# Patient Record
Sex: FEMALE | Race: WHITE | NOT HISPANIC OR LATINO | Employment: OTHER | URBAN - METROPOLITAN AREA
[De-identification: names, ages, dates, MRNs, and addresses within clinical notes are randomized per-mention and may not be internally consistent; named-entity substitution may affect disease eponyms.]

---

## 2018-05-14 ENCOUNTER — HISTORICAL (OUTPATIENT)
Dept: RADIOLOGY | Facility: HOSPITAL | Age: 58
End: 2018-05-14

## 2018-06-18 ENCOUNTER — HISTORICAL (OUTPATIENT)
Dept: RADIOLOGY | Facility: HOSPITAL | Age: 58
End: 2018-06-18

## 2018-08-21 ENCOUNTER — HISTORICAL (OUTPATIENT)
Dept: ADMINISTRATIVE | Facility: HOSPITAL | Age: 58
End: 2018-08-21

## 2018-08-21 LAB — CK SERPL-CCNC: 64 UNIT/L (ref 26–192)

## 2018-09-04 ENCOUNTER — HISTORICAL (OUTPATIENT)
Dept: RADIOLOGY | Facility: HOSPITAL | Age: 58
End: 2018-09-04

## 2018-10-15 ENCOUNTER — HISTORICAL (OUTPATIENT)
Dept: INTERNAL MEDICINE | Facility: CLINIC | Age: 58
End: 2018-10-15

## 2018-10-15 LAB
BUN SERPL-MCNC: 19 MG/DL (ref 7–18)
CALCIUM SERPL-MCNC: 8.9 MG/DL (ref 8.5–10.1)
CHLORIDE SERPL-SCNC: 104 MMOL/L (ref 98–107)
CO2 SERPL-SCNC: 29 MMOL/L (ref 21–32)
CREAT SERPL-MCNC: 0.9 MG/DL (ref 0.6–1.3)
CREAT/UREA NIT SERPL: 21
EST. AVERAGE GLUCOSE BLD GHB EST-MCNC: 117 MG/DL
GLUCOSE SERPL-MCNC: 83 MG/DL (ref 74–106)
HBA1C MFR BLD: 5.7 % (ref 4.2–6.3)
POTASSIUM SERPL-SCNC: 4.2 MMOL/L (ref 3.5–5.1)
SODIUM SERPL-SCNC: 140 MMOL/L (ref 136–145)

## 2019-12-11 ENCOUNTER — HISTORICAL (OUTPATIENT)
Dept: ADMINISTRATIVE | Facility: HOSPITAL | Age: 59
End: 2019-12-11

## 2019-12-11 LAB
ABS NEUT (OLG): 3.02 X10(3)/MCL (ref 2.1–9.2)
ALBUMIN SERPL-MCNC: 3.8 GM/DL (ref 3.4–5)
ALBUMIN/GLOB SERPL: 1 RATIO (ref 1.1–2)
ALP SERPL-CCNC: 117 UNIT/L (ref 45–117)
ALT SERPL-CCNC: 29 UNIT/L (ref 12–78)
AST SERPL-CCNC: 19 UNIT/L (ref 15–37)
BASOPHILS # BLD AUTO: 0 X10(3)/MCL (ref 0–0.2)
BASOPHILS NFR BLD AUTO: 1 %
BILIRUB SERPL-MCNC: 0.4 MG/DL (ref 0.2–1)
BILIRUBIN DIRECT+TOT PNL SERPL-MCNC: 0.1 MG/DL (ref 0–0.2)
BILIRUBIN DIRECT+TOT PNL SERPL-MCNC: 0.3 MG/DL
BUN SERPL-MCNC: 14 MG/DL (ref 7–18)
CALCIUM SERPL-MCNC: 9.3 MG/DL (ref 8.5–10.1)
CHLORIDE SERPL-SCNC: 109 MMOL/L (ref 98–107)
CHOLEST SERPL-MCNC: 274 MG/DL
CHOLEST/HDLC SERPL: 5.8 {RATIO} (ref 0–4.4)
CO2 SERPL-SCNC: 28 MMOL/L (ref 21–32)
CREAT SERPL-MCNC: 0.8 MG/DL (ref 0.6–1.3)
EOSINOPHIL # BLD AUTO: 0.1 X10(3)/MCL (ref 0–0.9)
EOSINOPHIL NFR BLD AUTO: 2 %
ERYTHROCYTE [DISTWIDTH] IN BLOOD BY AUTOMATED COUNT: 14 % (ref 11.5–14.5)
EST. AVERAGE GLUCOSE BLD GHB EST-MCNC: 126 MG/DL
GLOBULIN SER-MCNC: 4 GM/ML (ref 2.3–3.5)
GLUCOSE SERPL-MCNC: 95 MG/DL (ref 74–106)
HBA1C MFR BLD: 6 % (ref 4.2–6.3)
HCT VFR BLD AUTO: 41.9 % (ref 35–46)
HDLC SERPL-MCNC: 47 MG/DL (ref 40–59)
HGB BLD-MCNC: 12.8 GM/DL (ref 12–16)
IMM GRANULOCYTES # BLD AUTO: 0.02 10*3/UL
IMM GRANULOCYTES NFR BLD AUTO: 0 %
LDLC SERPL CALC-MCNC: 195 MG/DL
LYMPHOCYTES # BLD AUTO: 1.9 X10(3)/MCL (ref 0.6–4.6)
LYMPHOCYTES NFR BLD AUTO: 34 %
MCH RBC QN AUTO: 29 PG (ref 26–34)
MCHC RBC AUTO-ENTMCNC: 30.5 GM/DL (ref 31–37)
MCV RBC AUTO: 95 FL (ref 80–100)
MONOCYTES # BLD AUTO: 0.4 X10(3)/MCL (ref 0.1–1.3)
MONOCYTES NFR BLD AUTO: 7 %
NEUTROPHILS # BLD AUTO: 3.02 X10(3)/MCL (ref 2.1–9.2)
NEUTROPHILS NFR BLD AUTO: 55 %
PLATELET # BLD AUTO: 274 X10(3)/MCL (ref 130–400)
PMV BLD AUTO: 11.5 FL (ref 7.4–10.4)
POTASSIUM SERPL-SCNC: 4.1 MMOL/L (ref 3.5–5.1)
PROT SERPL-MCNC: 7.8 GM/DL (ref 6.4–8.2)
RBC # BLD AUTO: 4.41 X10(6)/MCL (ref 4–5.2)
SODIUM SERPL-SCNC: 141 MMOL/L (ref 136–145)
TRIGL SERPL-MCNC: 159 MG/DL
TSH SERPL-ACNC: 1.99 MIU/L (ref 0.36–3.74)
VLDLC SERPL CALC-MCNC: 32 MG/DL
WBC # SPEC AUTO: 5.5 X10(3)/MCL (ref 4.5–11)

## 2019-12-16 ENCOUNTER — HISTORICAL (OUTPATIENT)
Dept: SURGERY | Facility: HOSPITAL | Age: 59
End: 2019-12-16

## 2020-01-23 ENCOUNTER — HISTORICAL (OUTPATIENT)
Dept: RADIOLOGY | Facility: HOSPITAL | Age: 60
End: 2020-01-23

## 2020-03-06 ENCOUNTER — HISTORICAL (OUTPATIENT)
Dept: ADMINISTRATIVE | Facility: HOSPITAL | Age: 60
End: 2020-03-06

## 2020-03-06 LAB
CK SERPL-CCNC: 84 UNIT/L (ref 26–192)
CRP SERPL HS-MCNC: 5.71 MG/L (ref 0–3)
ERYTHROCYTE [SEDIMENTATION RATE] IN BLOOD: 27 MM/HR (ref 0–20)

## 2020-06-19 ENCOUNTER — HISTORICAL (OUTPATIENT)
Dept: ADMINISTRATIVE | Facility: HOSPITAL | Age: 60
End: 2020-06-19

## 2020-06-19 LAB
ALBUMIN SERPL-MCNC: 3.9 GM/DL (ref 3.4–5)
ALBUMIN/GLOB SERPL: 1 RATIO (ref 1.1–2)
ALP SERPL-CCNC: 96 UNIT/L (ref 45–117)
ALT SERPL-CCNC: 24 UNIT/L (ref 12–78)
AST SERPL-CCNC: 22 UNIT/L (ref 15–37)
BILIRUB SERPL-MCNC: 0.5 MG/DL (ref 0.2–1)
BILIRUBIN DIRECT+TOT PNL SERPL-MCNC: 0.2 MG/DL (ref 0–0.2)
BILIRUBIN DIRECT+TOT PNL SERPL-MCNC: 0.3 MG/DL
BUN SERPL-MCNC: 8 MG/DL (ref 7–18)
CALCIUM SERPL-MCNC: 9.1 MG/DL (ref 8.5–10.1)
CHLORIDE SERPL-SCNC: 109 MMOL/L (ref 98–107)
CHOLEST SERPL-MCNC: 159 MG/DL
CHOLEST/HDLC SERPL: 3.5 {RATIO} (ref 0–4.4)
CO2 SERPL-SCNC: 28 MMOL/L (ref 21–32)
CREAT SERPL-MCNC: 0.9 MG/DL (ref 0.6–1.3)
EST. AVERAGE GLUCOSE BLD GHB EST-MCNC: 160 MG/DL
GLOBULIN SER-MCNC: 3.8 GM/ML (ref 2.3–3.5)
GLUCOSE SERPL-MCNC: 100 MG/DL (ref 74–106)
HBA1C MFR BLD: 7.2 % (ref 4.2–6.3)
HDLC SERPL-MCNC: 46 MG/DL (ref 40–59)
LDLC SERPL CALC-MCNC: 89 MG/DL
POTASSIUM SERPL-SCNC: 4.2 MMOL/L (ref 3.5–5.1)
PROT SERPL-MCNC: 7.7 GM/DL (ref 6.4–8.2)
SODIUM SERPL-SCNC: 141 MMOL/L (ref 136–145)
TRIGL SERPL-MCNC: 118 MG/DL
VLDLC SERPL CALC-MCNC: 24 MG/DL

## 2020-08-03 ENCOUNTER — HISTORICAL (OUTPATIENT)
Dept: RADIOLOGY | Facility: HOSPITAL | Age: 60
End: 2020-08-03

## 2020-08-03 LAB — CREAT SERPL-MCNC: 0.9 MG/DL (ref 0.6–1.3)

## 2020-09-04 ENCOUNTER — HISTORICAL (OUTPATIENT)
Dept: LAB | Facility: HOSPITAL | Age: 60
End: 2020-09-04

## 2020-09-04 LAB
ALBUMIN SERPL-MCNC: 3.8 GM/DL (ref 3.4–5)
ALBUMIN/GLOB SERPL: 1.2 RATIO (ref 1.1–2)
ALP SERPL-CCNC: 86 UNIT/L (ref 45–117)
ALT SERPL-CCNC: 22 UNIT/L (ref 12–78)
AST SERPL-CCNC: 20 UNIT/L (ref 15–37)
BILIRUB SERPL-MCNC: 0.6 MG/DL (ref 0.2–1)
BILIRUBIN DIRECT+TOT PNL SERPL-MCNC: 0.2 MG/DL (ref 0–0.2)
BILIRUBIN DIRECT+TOT PNL SERPL-MCNC: 0.4 MG/DL
BUN SERPL-MCNC: 18 MG/DL (ref 7–18)
CALCIUM SERPL-MCNC: 9.2 MG/DL (ref 8.5–10.1)
CHLORIDE SERPL-SCNC: 107 MMOL/L (ref 98–107)
CHOLEST SERPL-MCNC: 188 MG/DL
CHOLEST/HDLC SERPL: 3.7 {RATIO} (ref 0–4.4)
CO2 SERPL-SCNC: 28 MMOL/L (ref 21–32)
CREAT SERPL-MCNC: 0.9 MG/DL (ref 0.6–1.3)
CREAT UR-MCNC: 191 MG/DL
EST. AVERAGE GLUCOSE BLD GHB EST-MCNC: 117 MG/DL
GLOBULIN SER-MCNC: 3.3 GM/ML (ref 2.3–3.5)
GLUCOSE SERPL-MCNC: 89 MG/DL (ref 74–106)
HBA1C MFR BLD: 5.7 % (ref 4.2–6.3)
HDLC SERPL-MCNC: 51 MG/DL (ref 40–59)
LDLC SERPL CALC-MCNC: 118 MG/DL
MICROALBUMIN UR-MCNC: 5.7 MG/L (ref 0–19)
MICROALBUMIN/CREAT RATIO PNL UR: 3 MCG/MG CR (ref 0–29)
POTASSIUM SERPL-SCNC: 4.6 MMOL/L (ref 3.5–5.1)
PROT SERPL-MCNC: 7.1 GM/DL (ref 6.4–8.2)
SODIUM SERPL-SCNC: 141 MMOL/L (ref 136–145)
TRIGL SERPL-MCNC: 94 MG/DL
TSH SERPL-ACNC: 3.62 MIU/L (ref 0.36–3.74)
VLDLC SERPL CALC-MCNC: 19 MG/DL

## 2021-04-16 ENCOUNTER — HISTORICAL (OUTPATIENT)
Dept: GASTROENTEROLOGY | Facility: CLINIC | Age: 61
End: 2021-04-16

## 2021-06-07 ENCOUNTER — HISTORICAL (OUTPATIENT)
Dept: ADMINISTRATIVE | Facility: HOSPITAL | Age: 61
End: 2021-06-07

## 2021-06-07 LAB
ALBUMIN SERPL-MCNC: 4.2 GM/DL (ref 3.4–4.8)
ALBUMIN/GLOB SERPL: 1.2 RATIO (ref 1.1–2)
ALP SERPL-CCNC: 83 UNIT/L (ref 40–150)
ALT SERPL-CCNC: 19 UNIT/L (ref 0–55)
AST SERPL-CCNC: 21 UNIT/L (ref 5–34)
BILIRUB SERPL-MCNC: 0.6 MG/DL
BILIRUBIN DIRECT+TOT PNL SERPL-MCNC: 0.2 MG/DL (ref 0–0.5)
BILIRUBIN DIRECT+TOT PNL SERPL-MCNC: 0.4 MG/DL (ref 0–0.8)
BUN SERPL-MCNC: 21 MG/DL (ref 9.8–20.1)
CALCIUM SERPL-MCNC: 9.6 MG/DL (ref 8.4–10.2)
CHLORIDE SERPL-SCNC: 105 MMOL/L (ref 98–107)
CO2 SERPL-SCNC: 28 MMOL/L (ref 23–31)
CREAT SERPL-MCNC: 0.92 MG/DL (ref 0.55–1.02)
CREAT UR-MCNC: 109 MG/DL (ref 45–106)
EST. AVERAGE GLUCOSE BLD GHB EST-MCNC: 108.3 MG/DL
GLOBULIN SER-MCNC: 3.4 GM/DL (ref 2.4–3.5)
GLUCOSE SERPL-MCNC: 101 MG/DL (ref 82–115)
HBA1C MFR BLD: 5.4 %
MICROALBUMIN UR-MCNC: 6.7 MG/L
MICROALBUMIN/CREAT RATIO PNL UR: 6.1 MG/GM CR (ref 0–30)
POTASSIUM SERPL-SCNC: 4.4 MMOL/L (ref 3.5–5.1)
PROT SERPL-MCNC: 7.6 GM/DL (ref 5.8–7.6)
SODIUM SERPL-SCNC: 142 MMOL/L (ref 136–145)
TSH SERPL-ACNC: 5.63 UIU/ML (ref 0.35–4.94)

## 2021-06-09 ENCOUNTER — HISTORICAL (OUTPATIENT)
Dept: ADMINISTRATIVE | Facility: HOSPITAL | Age: 61
End: 2021-06-09

## 2021-08-03 ENCOUNTER — HISTORICAL (OUTPATIENT)
Dept: INTERNAL MEDICINE | Facility: CLINIC | Age: 61
End: 2021-08-03

## 2021-08-03 LAB — TSH SERPL-ACNC: 2.54 UIU/ML (ref 0.35–4.94)

## 2021-09-27 ENCOUNTER — HISTORICAL (OUTPATIENT)
Dept: ADMINISTRATIVE | Facility: HOSPITAL | Age: 61
End: 2021-09-27

## 2021-09-27 LAB
ABS NEUT (OLG): 2.44 X10(3)/MCL (ref 2.1–9.2)
ALBUMIN SERPL-MCNC: 4 GM/DL (ref 3.4–4.8)
ALBUMIN/GLOB SERPL: 1.2 RATIO (ref 1.1–2)
ALP SERPL-CCNC: 81 UNIT/L (ref 40–150)
ALT SERPL-CCNC: 15 UNIT/L (ref 0–55)
APPEARANCE, UA: CLEAR
AST SERPL-CCNC: 21 UNIT/L (ref 5–34)
BACTERIA #/AREA URNS AUTO: ABNORMAL /HPF
BASOPHILS # BLD AUTO: 0 X10(3)/MCL (ref 0–0.2)
BASOPHILS NFR BLD AUTO: 1 %
BILIRUB SERPL-MCNC: 0.7 MG/DL
BILIRUB UR QL STRIP: NEGATIVE
BILIRUBIN DIRECT+TOT PNL SERPL-MCNC: 0.2 MG/DL (ref 0–0.5)
BILIRUBIN DIRECT+TOT PNL SERPL-MCNC: 0.5 MG/DL (ref 0–0.8)
BUN SERPL-MCNC: 15.6 MG/DL (ref 9.8–20.1)
CALCIUM SERPL-MCNC: 9.8 MG/DL (ref 8.4–10.2)
CHLORIDE SERPL-SCNC: 107 MMOL/L (ref 98–107)
CHOLEST SERPL-MCNC: 215 MG/DL
CHOLEST/HDLC SERPL: 4 {RATIO} (ref 0–5)
CO2 SERPL-SCNC: 28 MMOL/L (ref 23–31)
COLOR UR: ABNORMAL
CREAT SERPL-MCNC: 0.79 MG/DL (ref 0.55–1.02)
CREAT UR-MCNC: 44.1 MG/DL (ref 45–106)
EOSINOPHIL # BLD AUTO: 0.2 X10(3)/MCL (ref 0–0.9)
EOSINOPHIL NFR BLD AUTO: 3 %
ERYTHROCYTE [DISTWIDTH] IN BLOOD BY AUTOMATED COUNT: 14 % (ref 11.5–14.5)
EST. AVERAGE GLUCOSE BLD GHB EST-MCNC: 114 MG/DL
GLOBULIN SER-MCNC: 3.3 GM/DL (ref 2.4–3.5)
GLUCOSE (UA): NEGATIVE
GLUCOSE SERPL-MCNC: 84 MG/DL (ref 82–115)
HBA1C MFR BLD: 5.6 %
HCT VFR BLD AUTO: 42.8 % (ref 35–46)
HDLC SERPL-MCNC: 54 MG/DL (ref 35–60)
HGB BLD-MCNC: 13.4 GM/DL (ref 12–16)
HGB UR QL STRIP: 0.03 MG/DL
HYALINE CASTS #/AREA URNS LPF: ABNORMAL /LPF
IMM GRANULOCYTES # BLD AUTO: 0.01 10*3/UL
IMM GRANULOCYTES NFR BLD AUTO: 0 %
KETONES UR QL STRIP: NEGATIVE
LDLC SERPL CALC-MCNC: 142 MG/DL (ref 50–140)
LEUKOCYTE ESTERASE UR QL STRIP: NEGATIVE
LYMPHOCYTES # BLD AUTO: 1.9 X10(3)/MCL (ref 0.6–4.6)
LYMPHOCYTES NFR BLD AUTO: 39 %
MCH RBC QN AUTO: 29.9 PG (ref 26–34)
MCHC RBC AUTO-ENTMCNC: 31.3 GM/DL (ref 31–37)
MCV RBC AUTO: 95.5 FL (ref 80–100)
MICROALBUMIN UR-MCNC: <5 MG/L
MICROALBUMIN/CREAT RATIO PNL UR: <11.3 MG/GM CR (ref 0–30)
MONOCYTES # BLD AUTO: 0.4 X10(3)/MCL (ref 0.1–1.3)
MONOCYTES NFR BLD AUTO: 8 %
NEUTROPHILS # BLD AUTO: 2.44 X10(3)/MCL (ref 2.1–9.2)
NEUTROPHILS NFR BLD AUTO: 49 %
NITRITE UR QL STRIP: NEGATIVE
NRBC BLD AUTO-RTO: 0 % (ref 0–0.2)
PH UR STRIP: 6.5 [PH] (ref 4.5–8)
PLATELET # BLD AUTO: 245 X10(3)/MCL (ref 130–400)
PMV BLD AUTO: 11.3 FL (ref 7.4–10.4)
POTASSIUM SERPL-SCNC: 4.5 MMOL/L (ref 3.5–5.1)
PROT SERPL-MCNC: 7.3 GM/DL (ref 5.8–7.6)
PROT UR QL STRIP: NEGATIVE
RBC # BLD AUTO: 4.48 X10(6)/MCL (ref 4–5.2)
RBC #/AREA URNS AUTO: ABNORMAL /HPF
SODIUM SERPL-SCNC: 140 MMOL/L (ref 136–145)
SP GR UR STRIP: 1 (ref 1–1.03)
SQUAMOUS #/AREA URNS LPF: ABNORMAL /LPF
TRIGL SERPL-MCNC: 97 MG/DL (ref 37–140)
TSH SERPL-ACNC: 4.58 UIU/ML (ref 0.35–4.94)
UROBILINOGEN UR STRIP-ACNC: NORMAL
VLDLC SERPL CALC-MCNC: 19 MG/DL
WBC # SPEC AUTO: 4.9 X10(3)/MCL (ref 4.5–11)
WBC #/AREA URNS AUTO: ABNORMAL /HPF

## 2021-10-08 ENCOUNTER — HISTORICAL (OUTPATIENT)
Dept: GASTROENTEROLOGY | Facility: CLINIC | Age: 61
End: 2021-10-08

## 2021-10-08 LAB — SARS-COV-2 AG RESP QL IA.RAPID: NEGATIVE

## 2021-10-11 ENCOUNTER — HISTORICAL (OUTPATIENT)
Dept: ENDOSCOPY | Facility: HOSPITAL | Age: 61
End: 2021-10-11

## 2022-03-28 ENCOUNTER — HISTORICAL (OUTPATIENT)
Dept: ADMINISTRATIVE | Facility: HOSPITAL | Age: 62
End: 2022-03-28

## 2022-03-28 LAB
ALBUMIN SERPL-MCNC: 3.8 G/DL (ref 3.4–4.8)
ALBUMIN/GLOB SERPL: 1.1 {RATIO} (ref 1.1–2)
ALP SERPL-CCNC: 81 U/L (ref 40–150)
ALT SERPL-CCNC: 20 U/L (ref 0–55)
AST SERPL-CCNC: 17 U/L (ref 5–34)
BILIRUB SERPL-MCNC: 0.5 MG/DL
BILIRUBIN DIRECT+TOT PNL SERPL-MCNC: 0.2 (ref 0–0.5)
BILIRUBIN DIRECT+TOT PNL SERPL-MCNC: 0.3 (ref 0–0.8)
BUN SERPL-MCNC: 17.5 MG/DL (ref 9.8–20.1)
CALCIUM SERPL-MCNC: 9.9 MG/DL (ref 8.7–10.5)
CHLORIDE SERPL-SCNC: 106 MMOL/L (ref 98–107)
CHOLEST SERPL-MCNC: 200 MG/DL
CHOLEST/HDLC SERPL: 4 {RATIO} (ref 0–5)
CO2 SERPL-SCNC: 27 MMOL/L (ref 23–31)
CREAT SERPL-MCNC: 0.85 MG/DL (ref 0.55–1.02)
EST. AVERAGE GLUCOSE BLD GHB EST-MCNC: 111.2 MG/DL
GLOBULIN SER-MCNC: 3.4 G/DL (ref 2.4–3.5)
GLUCOSE SERPL-MCNC: 101 MG/DL (ref 82–115)
HBA1C MFR BLD: 5.5 %
HDLC SERPL-MCNC: 52 MG/DL (ref 35–60)
HEMOLYSIS INTERF INDEX SERPL-ACNC: 1
ICTERIC INTERF INDEX SERPL-ACNC: 1
LDLC SERPL CALC-MCNC: 130 MG/DL (ref 50–140)
LIPEMIC INTERF INDEX SERPL-ACNC: 26
POTASSIUM SERPL-SCNC: 4.6 MMOL/L (ref 3.5–5.1)
PROT SERPL-MCNC: 7.2 G/DL (ref 5.8–7.6)
SODIUM SERPL-SCNC: 139 MMOL/L (ref 136–145)
TRIGL SERPL-MCNC: 89 MG/DL (ref 37–140)
TSH SERPL-ACNC: 0.37 M[IU]/L (ref 0.35–4.94)
VLDLC SERPL CALC-MCNC: 18 MG/DL

## 2022-04-29 NOTE — PROGRESS NOTES
Patient:   Migdalia Moreno            MRN: [id number]            FIN: [id number]               Age:   58 years     Sex:  Female     :  1960   Associated Diagnoses:   None   Author:   Cristina ALICEA, Mari Ramsey reviewed: Will discuss with patient during follow up appointment on  Oct. 23, 2018 at 10:10am.

## 2022-04-29 NOTE — PROGRESS NOTES
Patient:   Migdalia Moreno            MRN: [id number]            FIN: [id number]               Age:   60 years     Sex:  Female     :  1960   Associated Diagnoses:   None   Author:   Cristina ALICEA, Mari Ramsey reviewed: Will discuss with patient during follow up appointment on  Oct. 1, 2020 at 8:40am.

## 2022-04-29 NOTE — PROGRESS NOTES
Patient:   Migdalia Moreno            MRN: [id number]            FIN: [id number]               Age:   60 years     Sex:  Female     :  1960   Associated Diagnoses:   None   Author:   Mari Evans MD      [id number] with Dr. Britta Zuniga and she has approved study; authorization number [id number] valid until 2020.

## 2022-05-02 NOTE — HISTORICAL [facility] CERNER
This is a historical note converted from Cerjimmy. Formatting and pictures may have been removed.  Please reference Lloyd for original formatting and attached multimedia. Indication for Surgery  Patient is a 59-year-old female who is referred to the surgery clinic for evaluation for a muscle biopsy. ?She has had progressive and intermittent weakness mostly affecting her proximal muscles but also causing intermittent eyelid droop. ?The risk, benefits, and alternatives to excisional muscle biopsy were discussed with patient at length. ?All questions were answered, consents signed, and she was scheduled for outpatient elective surgery.  Preoperative Diagnosis  Progressive muscle weakness  Postoperative Diagnosis  Same  Operation  Excisional muscle biopsy of the right anterior thigh  Surgeon(s)  Staff: Maicol Swanson MD  Residents: Geoff Delaney MD  Anesthesia  General  Estimated Blood Loss  5 cc  Findings  Grossly normal-appearing muscle.  Specimen(s)  Right thigh muscle biopsy  Complications  None  Technique  Patient was taken the operating room, transferred the operating table, and laid in supine position. ?General and?LMA?placement by the anesthesia team without difficulty. ?The patient was prepped and draped in usual sterile fashion using ChloraPrep solution. ?A timeout was performed confirming the patient, intended operation, and appropriate use of preoperative antibiotics DVT prophylaxis.? We began by making a small incision?to the lower right?anterolateral thigh. ?Dissection was carried down through the subcutaneous tissue with electrocautery to the level of the?overlying muscle fascia.? A #15 blade scalpel was used to make a small incision?into the fascia, and Metzenbaum scissors used to open the fascia?superiorly and inferiorly. ?Grossly?normal-appearing muscle was readily appreciated, and it was grasped at its lower aspect with a hemostat. ?A second hemostat was placed inferior to this and a 15 blade  scalpel used to excise?a segment of muscle. ?Metzenbaum scissors were used to carry?the muscle excision superiorly to the boundary of our?fascial opening, and a hemostat placed around the superior aspect of the muscle prior to ligating her specimen.? The muscle specimen was sent off as a fresh specimen, and 2-0 silk suture used to?remaining?ends of muscle.? Hemostasis was ensured with electrocautery and the fascia was closed using a 3-0 Vicryl suture in a running fashion.? A 4-0 Monocryl suture was then used in a subcuticular fashion to reapproximate the skin and sterile dressings applied. ?The patient tolerated this procedure well without complications. ?All counts of the procedure were correct. ?Dr. Swanson was present for the critical portions of this case and was immediately available throughout the entire duration of this case.   This certifies I was present during the entire period between opening and closing of the surgical field.

## 2022-05-02 NOTE — HISTORICAL [facility] CERNER
This is a historical note converted from Lloyd. Formatting and pictures may have been removed.  Please reference Lloyd for original formatting and attached multimedia. Chief Complaint  Follow up appt  History of Present Illness  61 year old white female presents for follow up. Compliant with medications as prescribed.  Complaining of a few weeks of right shoulder decreased range of motion but makes it clear that the only time she has pain is when reaching across her chest. 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  Also complaining of a mass in right lower back which has been present since 1981; was evaluated by multiple physicians in [place] which is where she lived at the time, and no one was able to give her a diagnosis. It tends to flare up from time to time; recently flared, she took Tylenol muscle relief and experienced significant relief.  Cancelled colonoscopy after her 2nd COVID-19 vaccine as she was having diarrhea; will reschedule today.  Has a follow? up appointment in Neuro clinic this afternoon to discuss starting steroids for her MG.  Review of Systems  ????Constitutional: No fever, No chills, No weakness, No fatigue.  ?????Eye: No recent visual problem.  ?????Respiratory: No shortness of breath, No cough, No sputum production, No wheezing.  ?????Cardiovascular: No chest pain, No palpitations, No peripheral edema.  ?????Gastrointestinal: No nausea, No vomiting, No diarrhea, No constipation, No heartburn, No abdominal pain.  ?????Genitourinary: No dysuria.  ?????Endocrine: No excessive thirst, No polyuria, No cold intolerance, No heat intolerance.  ?????Musculoskeletal: No back pain, No joint pain, No decreased range of motion.  ?????Integumentary: No rash, No pruritus.  ?????Neurologic: Alert and oriented X4, No numbness, No tingling, No headache.  ?????Psychiatric: No anxiety, No depression.  Physical Exam  Vitals & Measurements  T:?36.7?  ?C (Oral)? HR:?68(Peripheral)? RR:?18? BP:?107/70?  HT:?174.00?cm? WT:?79.700?kg? BMI:?26.32?  General: Alert and oriented, No acute distress.  ?????Eye: Pupils are equal, round and reactive to light, Extraocular movements are intact, Normal conjunctiva.  ?????HENT: Normocephalic, Oral mucosa is moist, No pharyngeal erythema.  ?????Neck: Supple, Non-tender.  ?????Respiratory: Lungs are clear to auscultation, Respirations are non-labored.  ?????Cardiovascular: Normal rate, Regular rhythm, No murmur, Good pulses equal in all extremities, No edema.  ?????Gastrointestinal: Soft, Non-tender, Non-distended, Normal bowel sounds.  ?????Musculoskeletal:? Right upper extremity reveal decreased ROM on extension, no tenderness to palpation of the shoulder, Normal strength.  ?????Integumentary: Warm, Dry. Non-tender, soft and mobile mass in right flank.  ?????Neurologic: Alert, Oriented.  ?????Cognition and Speech: Oriented, Speech clear and coherent.  ?????Psychiatric: Cooperative, Appropriate mood & affect.  Assessment/Plan  New onset type 2 diabetes mellitus?E11.9  Ordered:  glipiZIDE, 5 mg = 1 tab(s), Oral, Daily, with breakfast, # 90 tab(s), 2 Refill(s), Pharmacy: [facility] #[id number], 174, cm, Height/Length Dosing, 06/09/21 10:04:00 CDT, 79.7, kg, Weight Dosing, 06/09/21 10:04:00 CDT  CBC w/ Auto Diff, Routine collect, *Est. 09/09/21 3:00:00 CDT, Blood, Order for future visit, *Est. Stop date 09/09/21 3:00:00 CDT, Lab Collect, Hypothyroidism  Well controlled type 2 diabetes mellitus  Myasthenia gravis  Mixed hyperlipidemia  Chronic constipation...  Clinic Follow up, *Est. 09/16/21 3:00:00 CDT, Order for future visit, Hypothyroidism  Well controlled type 2 diabetes mellitus  Myasthenia gravis  Mixed hyperlipidemia  Chronic constipation  History of incontinence of feces  Wellness examination  Clinic Follow up, *Est. 09/09/21 3:00:00 CDT, FASTING labs with Misty, Order for future visit, Hypothyroidism  Well  controlled type 2 diabetes mellitus  Myasthenia gravis  Mixed hyperlipidemia  Chronic constipation  History of incontinence of feces  Wellness exa...  Comprehensive Metabolic Panel, Routine collect, *Est. 09/09/21 3:00:00 CDT, Blood, Order for future visit, *Est. Stop date 09/09/21 3:00:00 CDT, Lab Collect, Hypothyroidism  Well controlled type 2 diabetes mellitus  Myasthenia gravis  Mixed hyperlipidemia  Chronic constipation...  Hemoglobin A1C UHC, Routine collect, *Est. 09/09/21 3:00:00 CDT, Blood, Order for future visit, *Est. Stop date 09/09/21 3:00:00 CDT, Lab Collect, Hypothyroidism  Well controlled type 2 diabetes mellitus  Myasthenia gravis  Mixed hyperlipidemia  Chronic constipation...  Lipid Panel, Routine collect, *Est. 09/09/21 3:00:00 CDT, Blood, Order for future visit, *Est. Stop date 09/09/21 3:00:00 CDT, Lab Collect, Hypothyroidism  Well controlled type 2 diabetes mellitus  Myasthenia gravis  Mixed hyperlipidemia  Chronic constipation...  Microalbum/Creatinine Ratio Urine (Microalb/Creat), Routine collect, Urine, Order for future visit, *Est. 09/09/21 3:00:00 CDT, *Est. Stop date 09/09/21 3:00:00 CDT, Nurse collect, Hypothyroidism  Well controlled type 2 diabetes mellitus  Myasthenia gravis  Mixed hyperlipidemia  Chronic constipatio...  Thyroid Stimulating Hormone, Routine collect, *Est. 09/09/21 3:00:00 CDT, Blood, Order for future visit, *Est. Stop date 09/09/21 3:00:00 CDT, Lab Collect, Hypothyroidism  Well controlled type 2 diabetes mellitus  Myasthenia gravis  Mixed hyperlipidemia  Chronic constipation...  Urinalysis with Microscopic if Indicated, Routine collect, Urine, Order for future visit, *Est. 09/09/21 3:00:00 CDT, *Est. Stop date 09/09/21 3:00:00 CDT, Nurse collect, Hypothyroidism  Well controlled type 2 diabetes mellitus  Myasthenia gravis  Mixed hyperlipidemia  Chronic constipatio...  ?  Orders:  levothyroxine, 100 mcg = 1 tab(s), Oral, Daily, # 30 tab(s), 1  Refill(s), Pharmacy: [facility] #[id number], 174, cm, Height/Length Dosing, 06/09/21 10:04:00 CDT, 79.7, kg, Weight Dosing, 06/09/21 10:04:00 CDT  simvastatin, 20 mg = 1 tab(s), Oral, Once a day (at bedtime), # 90 tab(s), 2 Refill(s), Pharmacy: [facility] #[id number], 174, cm, Height/Length Dosing, 06/09/21 10:04:00 CDT, 79.7, kg, Weight Dosing, 06/09/21 10:04:00 CDT  MG Screening Bilateral, Routine, *Est. 06/09/21 3:00:00 CDT, Screening, None, Ambulatory, Rad Type, Order for future visit, Breast cancer screening by mammogram, Schedule this test, [facility], Follow Breast Imaging Order Set Protocol, *Est. 06/09/21 3:0...  Thyroid Stimulating Hormone, Routine collect, *Est. 07/21/21 3:00:00 CDT, Blood, Order for future visit, *Est. Stop date 07/21/21 3:00:00 CDT, Lab Collect, Hypothyroidism, 06/09/21 9:53:00 CDT  US Soft Tissue on Back, Routine, *Est. 06/09/21 3:00:00 CDT, Mass, Right lower back mass, None, Ambulatory, Rad Type, Order for future visit, Mass on back, Schedule this test, [facility] and [facility], *Est. 06/09/21 3:00:00 CDT  XR Shoulder Right Minimum 2 Views, Routine, 06/09/21 10:12:00 CDT, None, Ambulatory, Rad Type, Order for future visit, Right shoulder pain, Not Scheduled, 06/09/21 10:12:00 CDT  ?  1.? Hypothyroidism: TSH 5.6343.  Increase levothyroxine to 100mcg PO daily x6 weeks.  Repeat TSH in 6 weeks.  2.? Mixed hyperlipidemia:?Continue simvastatin 20mg PO daily.  3.? Type 2 DM: HbA1c 5.4%.  Stable on Glipizide 5mg XL with breakfast.  Advised on diabetic diet.  4.? Myasthenia Gravis: Will defer to Dr. Singer.  5.? Chronic constipation, fecal incontinence: Will defer to GI clinic.  6.? Wellness: Fasting labs 1 week prior to follow up in 3 months.  7.? Acute right shoulder pain:? Xray today, may need physical therapy.  8.? Mass of right lower back:? US within 4 weeks.  ?   Screening: Mammogram: Up-to-date, Jan. 2020. Patient was not scheduled in Jan. 2021;  re-ordered to be done within 1 month.  ??????????????? Colonoscopy:??Advised to reschedule?missed?appt. on Jan. 25, 2021.  ??????????????? Pap smear: Up-to-date, April 2021.  ?   Reviewed lab results with patient today.  Patient voiced understanding.  Referrals  Clinic Follow up, *Est. 09/16/21 3:00:00 CDT, Order for future visit, Hypothyroidism  Well controlled type 2 diabetes mellitus  Myasthenia gravis  Mixed hyperlipidemia  Chronic constipation  History of incontinence of feces  Wellness examination  Clinic Follow up, *Est. 09/09/21 3:00:00 CDT, FASTING labs with Misty, Order for future visit, Hypothyroidism  Well controlled type 2 diabetes mellitus  Myasthenia gravis  Mixed hyperlipidemia  Chronic constipation  History of incontinence of feces  Wellness exa...   Problem List/Past Medical History  Ongoing  Arthralgia  Diabetes mellitus  Muscle weakness  Myasthenia gravis  Thyroid disease  Historical  Pregnant  Pregnant  Pregnant  Procedure/Surgical History  Colonoscopy (08/20/2020)  Biopsy Muscle (None) (12/16/2019)  Biopsy, muscle; deep (12/16/2019)  Excision of Right Upper Leg Muscle, Open Approach, Diagnostic (12/16/2019)  Diabetic retinal eye exam   Medications  glipiZIDE 5 mg oral tablet, extended release (XL tab), 5 mg= 1 tab(s), Oral, Daily, 2 refills  levothyroxine 100 mcg (0.1 mg) oral tablet, 100 mcg= 1 tab(s), Oral, Daily, 1 refills  Linzess 72 mcg oral capsule, 72 mcg= 1 cap(s), Oral, Daily, 5 refills  Mestinon 60 mg oral tablet, 60 mg= 1 tab(s), Oral, TID, 4 refills  simvastatin 20 mg oral tablet, 20 mg= 1 tab(s), Oral, Once a day (at bedtime), 2 refills  Allergies  Lortab?(itching)  codeine?(itching)  penicillins?(itch and swell)  Social History  Abuse/Neglect  No, No, Yes, 06/09/2021  Alcohol  Past, 06/09/2021  Employment/School  Unemployed, 06/09/2021  Exercise  Exercise type: Denies Any., 06/09/2021  Home/Environment  Lives with Children. Living situation: Home/Independent.,  2021  Nutrition/Health  Regular, Good, 2021  Sexual  Sexually active: No. Gender Identity Identifies as female., 2021  Spiritual/Cultural  Lutheran, 2020  Substance Use  Never, 2021  Tobacco  Never (less than 100 in lifetime), N/A, 2021  Family History  Autoimmune disease: Mother.  Congestive heart disease.: Brother.  : Mother and Father.  Throat cancer: Father.  Sister: History is negative  Immunizations  Vaccine Date Status   COVID-19 mRNA, LNP-S, PF - Moderna 2021 Recorded   COVID-19 mRNA, LNP-S, PF - Moderna 03/10/2021 Recorded   influenza virus vaccine, inactivated 10/28/2019 Recorded   Health Maintenance  Health Maintenance  ???Pending?(in the next year)  ??? ??OverDue  ??? ? ? ?Alcohol Misuse Screening due??19??and every 1??year(s)  ??? ? ? ?Tetanus Vaccine due??19??and every 10??year(s)  ??? ? ? ?Influenza Vaccine due??10/01/20??and every 1??day(s)  ??? ??Due?  ??? ? ? ?Aspirin Therapy for CVD Prevention due??21??and every 1??year(s)  ??? ? ? ?Diabetes Maintenance-Foot Exam due??21??Unknown Frequency  ??? ? ? ?Zoster Vaccine due??21??Unknown Frequency  ??? ??Due In Future?  ??? ? ? ?Diabetes Maintenance-Eye Exam not due until??21??and every 1??year(s)  ??? ? ? ?Diabetes Maintenance-HgbA1c not due until??21??and every 1??year(s)  ??? ? ? ?Diabetes Maintenance-Fasting Lipid Profile not due until??21??and every 1??year(s)  ??? ? ? ?Obesity Screening not due until??22??and every 1??year(s)  ??? ? ? ?Breast Cancer Screening not due until??22??and every 2??year(s)  ??? ? ? ?Blood Pressure Screening not due until??22??and every 1??year(s)  ??? ? ? ?Body Mass Index Check not due until??22??and every 1??year(s)  ??? ? ? ?Depression Screening not due until??22??and every 1??year(s)  ??? ? ? ?ADL Screening not due until??22??and every 1??year(s)  ??? ? ? ?Diabetes Maintenance-Serum  Creatinine not due until??06/07/22??and every 1??year(s)  ???Satisfied?(in the past 1 year)  ??? ??Satisfied?  ??? ? ? ?ADL Screening on??04/28/21.??Satisfied by Erika Zacarias LPN  ??? ? ? ?Blood Pressure Screening on??04/28/21.??Satisfied by Erika Zacarias LPN  ??? ? ? ?Body Mass Index Check on??04/28/21.??Satisfied by Erika Zacarias LPN  ??? ? ? ?Cervical Cancer Screening on??04/28/21.??Satisfied by Regina Dumont  ??? ? ? ?Colorectal Screening on??08/20/20.??Satisfied by Rachel Aranda  ??? ? ? ?Depression Screening on??04/28/21.??Satisfied by Erika Zacarias LPN  ??? ? ? ?Diabetes Maintenance-Microalbumin on??06/07/21.??Satisfied by Peg Mckeon  ??? ? ? ?Diabetes Screening on??06/07/21.??Satisfied by Peg Mckeon  ??? ? ? ?Influenza Vaccine on??03/09/21.??Satisfied by Aleyda Leon RN  ??? ? ? ?Lipid Screening on??09/04/20.??Satisfied by Addy Meza Jr.  ??? ? ? ?Obesity Screening on??04/28/21.??Satisfied by Erika Zacarias LPN  ?

## 2022-05-02 NOTE — HISTORICAL [facility] CERNER
This is a historical note converted from Cerjimmy. Formatting and pictures may have been removed.  Please reference Cerjimmy for original formatting and attached multimedia. History of Present Illness  61F with h/o chronic constipation now with new complaints of intermittent incontinence. Is also requesting an EGD for dysphagia and difficulty swallowing, globus sensation which has been occurring for years which she attributes to her history of myasthenia gravis. She denies n/v, abdominal pain, blood per rectum, melena. No prior EGD. Has had prior colonoscopy which she reports was normal.  Review of Systems  General: no acute distress, comfortable on exam  Neuro: no c/o HA, deficits in sensation or weakness  HEENT: no c/o HA, sinus drainage, dysphagia  Resp: no c/o cough, or phlegm production  CV: no c/o palpitations or CP  GI: no c/o abdominal pain, nausea or vomiting, +intermittent difficulty swallowing  : no co dysuria or increased urinary frequency  Heme: no c/o easy bruising/bleeding  Endo: no c/o heat/cold intolerance  MSK: no c/o weakness, paresthesias, pain  ?  Physical Exam  Vitals & Measurements  T:?36.7? ?C (Oral)? HR:?62(Monitored)? RR:?16? BP:?106/81? SpO2:?99%? WT:?81.1?kg? BMI:?27.19?  Assessment/Plan  61F w/ ho DM,?myasthenia gravis presenting for scheduled colonoscopy for chronic constipation. Also requesting EGD.  Proceed to endoscopy  r/b/a reviewed questions answered  ?  ?  ?  -------------------------------------------  David Vargas MD  [employer]?General Surgery PGY-2   Problem List/Past Medical History  Ongoing  Arthralgia  Diabetes mellitus  Muscle weakness  Myasthenia gravis  Thyroid disease  Historical  Pregnant  Pregnant  Pregnant  Procedure/Surgical History  Colonoscopy (08/20/2020)  Biopsy Muscle (None) (12/16/2019)  Biopsy, muscle; deep (12/16/2019)  Excision of Right Upper Leg Muscle, Open Approach, Diagnostic (12/16/2019)  Diabetic retinal eye exam   Medications  Inpatient  buffered lidocaine  2% - 0.5 ml syringe, 10 mg= 0.5 mL, Subcutaneous, As Directed  IVF Lactated Ringers LR Infusion 1,000 mL, 1000 mL, IV  Lidocaine Viscous 2% mucous membrane solution, 15 mL/EA, N/A, Once  Home  glipiZIDE 5 mg oral tablet, extended release (XL tab), 5 mg= 1 tab(s), Oral, Daily, 2 refills  levothyroxine 100 mcg (0.1 mg) oral tablet, 100 mcg= 1 tab(s), Oral, Daily, 2 refills  Mestinon 60 mg oral tablet, 60 mg= 1 tab(s), Oral, TID, 4 refills  Prilosec 20 mg oral DR capsule, 20 mg= 1 cap(s), Oral, Daily, 4 refills  simvastatin 20 mg oral tablet, 20 mg= 1 tab(s), Oral, Once a day (at bedtime), 2 refills  Allergies  Lortab?(itching)  codeine?(itching)  penicillins?(itch and swell)  Social History  Abuse/Neglect  No, No, Yes, 10/11/2021  Alcohol  Past, 2021  Employment/School  Unemployed, 2021  Exercise  Exercise type: Denies Any., 2021  Home/Environment  Lives with Children. Living situation: Home/Independent., 2021  Nutrition/Health  Regular, Good, 2021  Sexual  Sexually active: No. Gender Identity Identifies as female., 2021  Spiritual/Cultural  Jain, 2020  Substance Use  Never, 2021  Tobacco  Never (less than 100 in lifetime), No, 10/11/2021  Family History  Autoimmune disease: Mother.  Congestive heart disease.: Brother.  : Mother and Father.  Throat cancer: Father.  Sister: History is negative  Immunizations  Vaccine Date Status   COVID-19 mRNA, LNP-S, PF - Moderna 2021 Recorded   COVID-19 mRNA, LNP-S, PF - Moderna 03/10/2021 Recorded   influenza virus vaccine, inactivated 10/28/2019 Recorded       She has a longstanding history of chronic constipation.? She may go 1-2 weeks without a BM.? She was given linzess but has not been taking it regularly since starting mestinon for her MG as this medication does sometimes? helps her to have a BM, although it may be loose when it does.? It was recently increased from bid to tid so she is wanting to see if it  continues to help her BMs.? She denies significant bleeding.? Had an episode recently of severe epigastric and generalized abdominal pain, anorexia, nausea, diarrhea that persisted for several days after eating something that did not agree with her.? She took her daughters protonix and carafate which provided her relief.? She does not have heartburn or reflux on a regular basis. She denies ever having an EGD done. ?She had a colonoscopy approximately 20 years ago with Dr. Irvin that was reportedly normal. ?She denies regular NSAID use or use of blood thinners. ?She denies tobacco or alcohol use.? She denies a family history of IBD, colon polyps, or colon cancer.

## 2022-05-02 NOTE — HISTORICAL [facility] CERNER
This is a historical note converted from Lloyd. Formatting and pictures may have been removed.  Please reference Lloyd for original formatting and attached multimedia. Chief Complaint  Follow up for Myasthenia Gravis, and Neuromuscular weakness  History of Present Illness  This is a 60 yo Right Handed F with hx of hypothyroidism, DM II, who is referred for?suspected?generalized?exertional?weakness with proximal muscle and bulbar involvement, concerning of generalized?Neuromuscular?Junction Disease. She was last seen on 1/17/2020. During that visit, she was started on Mestinon 60mg with titration to TID. Anti-MuSK Ab along with Anti-Neuromuscular?Junction Ab?titers were drawn. Still c/o joint pain?and?swelling throughout. Does not feel any difference in terms of weakness after taking Mestinon.  ?   Time of Onset - 2014  ?   Semiology -?exertional SOB, proximal hip and?shoulder?weakness with exertion.?Has trouble with swallowing for six years. Now with?diplopia?around?3?month.?Has trouble chewing for 1 month. Has?trouble with ptosis for at least 1 year. Symptoms improve with rest. Symptoms impeding day? to day activity.  ?   RF - does not smoke. 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   Medication -  Mestinon 60mg TID (2/2020 - present)  ?   Workup -  Anti-AChR Bind Ab Lv - [facility] 12/11/2019 - 0?  Anti-AChR Blocking?Ab Lv - [facility] 12/11/2019? - 0?  Anti-MuSK?Ab?Lv-?[facility] 1/17/2020?- 0?  Anti-Titin?Ab?Lv?- [facility] 1/17/2020 -?0  LES?Panel?1/17/2020 - negative?  Anti-?Striated?Muscle?Ab?lv? a1/17/2020?- negative  ?   Muscle Biopsy 12/2019 -  Type II myofiber atrophy is a nonspecific finding that may be present in  the setting of chronic steroid use, Cushings syndrome, disuse atrophy,  myasthenia gravis, upper motor neuron disease, congenital hypotonia,  hyperparathyroidism, paraneoplastic syndromes, connective tissue  disorders and alcoholic myopathy.  ?  The etiology of this acute denervation is unclear. The differential  diagnosis of acute  denervation would include but is not limited to motor  neuron diseases, plexitis, radiculopathy, complications of diabetes,  acute neuropathies, muscle trauma or critical illness polyneuropathy.  Clinical correlation is required.  ?   MRI Lumbar 6/18/2018 - I have reviewed the study independently and with the patient. moderate spinal stenosis noted at L2-3, no significant neuroforaminal stenosis noted.  ?  MRI Brain +/- [facility] 5/2018 - I have reviewed the study independently and with the patient. Unremarkable MRI Brain.  Review of Systems  Constitutional:?no fever, + fatigue, +?weakness  Eye:?+ vision loss, eye redness, drainage, or pain  ENMT:?no sore throat, ear pain, sinus pain/congestion, nasal congestion/drainage  Respiratory:?no cough, no wheezing, no shortness of breath  Cardiovascular:?no chest pain, no palpitations, no edema  Gastrointestinal:?no nausea, vomiting, or diarrhea. No abdominal pain. + stool incontinence.  Genitourinary:?no dysuria, no urinary frequency or urgency, no hematuria  Hema/Lymph:?no abnormal bruising or bleeding  Endocrine:?no heat or cold intolerance, no excessive thirst or excessive urination  Musculoskeletal:?+ muscle and joint pain,?+ joint swelling  Integumentary:?no skin rash or abnormal lesion  Psychiatric: no depressed mood, no anxiety, no visual/auditory hallucinations, no delusions,? no suicidal or homicidal ideation  Neurologic: as per HPI  ?  Physical Exam  Vitals & Measurements  T:?36.8? ?C (Oral)? HR:?64(Peripheral)? RR:?20? BP:?144/81? HT:?172?cm? WT:?82.6?kg?  General:?well-developed well-nourished in no acute distress  Eye: clear conjunctiva, eyelids normal  HENT:?TMs/ear canals clear, oropharynx without erythema/exudate, oropharynx and nasal mucosal surfaces moist, no maxillary/frontal sinus tenderness to palpation  Neck: full range of motion, no thyromegaly or lymphadenopathy  Respiratory:?clear to auscultation bilaterally  Cardiovascular:?regular rate and rhythm  without murmurs, gallops or rubs  Gastrointestinal:?soft, non-tender, non-distended with normal bowel sounds, without masses to palpation  Genitourinary: no CVA tenderness to palpation  Musculoskeletal:?full range of motion of all extremities/spine without limitation or discomfort  Integumentary: no rashes or skin lesions present  Neurologic:  Mental Status- Alert and Oriented to time, self, place, Speech is fluent, with intact repetition, naming, reading, and comprehension., No Dysarthria.  CN II-XII - ?CN I Deferred, JACI, Fundus sharp, non-pallor, no RAPD, VA grossly normal to finger counting at 6ft, OD 0.5mm > 1 mm?latent ptosis,?OS?0.3 mm?>?1.5?cm?latent ptosis, with myokymia, ?frontal recruitment noted, EOMI, LT/PP symmetric, intact in CN V1-V3 distribution b/l, masseter symmetric b/l, T/U midline, Shoulder shrug symmetric b/l, Hearing grossly intact b/l, VFFC, Face Symmetric, 4/5 eye closure b/l.  Motor - Tone and Bulk nml throughout, No?involuntary movements,?4/5 to confrontation?in?proximal?UE/LE with delayed?4-/5 after repetition, 4/5 neck flexion, 4+/5 neck extension, FFM nml b/l, No pronator drift.  Sensory - LT/PP/Temp/Proprioception/Vibration symmetric intact throughout.  Reflexes - ?2+ Throughout, Babinski negative.  Cerebellar Exam - ?FNF wnl b/l no intention tremor.  Gait -?labored, trouble picking her right leg up. Functional component noted.  ?   last Mestinon dose - 2100  Assessment/Plan  1.?Muscle weakness?M62.81  This is a 60 yo Right Handed F with hx of hypothyroidism, DM II, who is referred for?suspected?generalized?exertional?weakness with proximal muscle and bulbar involvement, concerning of generalized?Neuromuscular?Junction Disease. Currently?all panels?negative.?She also has?multiple?other complaints, such as?migrating?arthralgia, edema, and?cognitive complaints.?She is also?not feeling better?with?Mestinon.?Will?do further autoimmune?workup and?paraneoplastic workup.?  ?  ?rtc 3  months  Ordered:  Aldolase-[facility] [id number], Routine collect, 03/06/20 9:00:00 CST, Blood, Order for future visit, Stop date 03/06/20 9:00:00 CST, Lab Collect, Muscle weakness  Arthralgia, 03/06/20 9:00:00 CST  Anti-dsDNA (Double-stranded) Abs-[facility] [id number], Routine collect, 03/06/20 9:02:00 CST, Blood, Order for future visit, Stop date 03/06/20 9:02:00 CST, Lab Collect, Muscle weakness  Arthralgia, 03/06/20 9:02:00 CST  Anti-Nuclear(DUGLAS) IgG Ab w/ Rflx to IFA-[facility] [id number], Now collect, 03/06/20 9:03:00 CST, Blood, Order for future visit, Stop date 03/06/20 9:03:00 CST, Lab Collect, Muscle weakness  Arthralgia, 03/06/20 9:03:00 CST  C-Reactive Protein High Sensitivity, Routine collect, 03/06/20 9:00:00 CST, Blood, Order for future visit, Stop date 03/06/20 9:00:00 CST, Lab Collect, Muscle weakness  Arthralgia, 03/06/20 9:00:00 CST  Complement C3-[facility] [id number], Routine collect, 03/06/20 9:02:00 CST, Blood, Order for future visit, Stop date 03/06/20 9:02:00 CST, Lab Collect, Muscle weakness  Arthralgia, 03/06/20 9:02:00 CST  Complement C4-[facility] [id number], Routine collect, 03/06/20 9:02:00 CST, Blood, Order for future visit, Stop date 03/06/20 9:02:00 CST, Lab Collect, Muscle weakness  Arthralgia, 03/06/20 9:02:00 CST  Creatine Kinase, Routine collect, 03/06/20 9:00:00 CST, Blood, Order for future visit, Stop date 03/06/20 9:00:00 CST, Lab Collect, Muscle weakness  Arthralgia, 03/06/20 9:00:00 CST  Cyclic Citrullinated Peptid IgG [id number], Routine collect, *Est. 03/07/20 3:00:00 CST, Blood, Order for future visit, *Est. Stop date 03/07/20 3:00:00 CST, Lab Collect, Muscle weakness  Arthralgia  Paraneoplastic neurologic disorder, 03/06/20 9:10:00 CST  Miscellaneous Lab Test, Routine collect, Blood, *Est. 03/08/20 3:00:00 CDT, Order for future visit, Send Out [facility] - Paraneoplastic Reflexive Panel [id number], Lab Collect, *Est. Stop date 03/08/20 3:00:00 CDT, Muscle weakness  Arthralgia, Other  Rheumatoid Factor  IgM, IgG, IgA by [id number], Routine collect, 03/06/20 9:03:00 CST, Blood, Order for future visit, Stop date 03/06/20 9:03:00 CST, Lab Collect, Muscle weakness  Arthralgia, 03/06/20 9:03:00 CST  RNP and Smith Antibodies, [id number], Routine collect, 03/06/20 9:02:00 CST, Blood, Order for future visit, Stop date 03/06/20 9:02:00 CST, Lab Collect, Muscle weakness  Arthralgia, 03/06/20 9:02:00 CST  Sedimentation Rate, Routine collect, 03/06/20 9:00:00 CST, Blood, Order for future visit, Stop date 03/06/20 9:00:00 CST, Lab Collect, Muscle weakness  Arthralgia, 03/06/20 9:00:00 CST  SSA and SSB IgG [id number], Routine collect, 03/06/20 9:02:00 CST, Blood, Order for future visit, Stop date 03/06/20 9:02:00 CST, Lab Collect, Muscle weakness  Arthralgia, 03/06/20 9:02:00 CST  ?  2.?Arthralgia?M25.50  Ordered:  Aldolase-[facility] [id number], Routine collect, 03/06/20 9:00:00 CST, Blood, Order for future visit, Stop date 03/06/20 9:00:00 CST, Lab Collect, Muscle weakness  Arthralgia, 03/06/20 9:00:00 CST  Anti-dsDNA (Double-stranded) Abs-[facility] [id number], Routine collect, 03/06/20 9:02:00 CST, Blood, Order for future visit, Stop date 03/06/20 9:02:00 CST, Lab Collect, Muscle weakness  Arthralgia, 03/06/20 9:02:00 CST  Anti-Nuclear(DUGLAS) IgG Ab w/ Rflx to IFA-[facility] [id number], Now collect, 03/06/20 9:03:00 CST, Blood, Order for future visit, Stop date 03/06/20 9:03:00 CST, Lab Collect, Muscle weakness  Arthralgia, 03/06/20 9:03:00 CST  C-Reactive Protein High Sensitivity, Routine collect, 03/06/20 9:00:00 CST, Blood, Order for future visit, Stop date 03/06/20 9:00:00 CST, Lab Collect, Muscle weakness  Arthralgia, 03/06/20 9:00:00 CST  Complement C3-[facility] [id number], Routine collect, 03/06/20 9:02:00 CST, Blood, Order for future visit, Stop date 03/06/20 9:02:00 CST, Lab Collect, Muscle weakness  Arthralgia, 03/06/20 9:02:00 CST  Complement C4-[facility] [id number], Routine collect, 03/06/20 9:02:00 CST, Blood, Order for  future visit, Stop date 03/06/20 9:02:00 CST, Lab Collect, Muscle weakness  Arthralgia, 03/06/20 9:02:00 CST  Creatine Kinase, Routine collect, 03/06/20 9:00:00 CST, Blood, Order for future visit, Stop date 03/06/20 9:00:00 CST, Lab Collect, Muscle weakness  Arthralgia, 03/06/20 9:00:00 CST  Cyclic Citrullinated Peptid IgG [id number], Routine collect, *Est. 03/07/20 3:00:00 CST, Blood, Order for future visit, *Est. Stop date 03/07/20 3:00:00 CST, Lab Collect, Muscle weakness  Arthralgia  Paraneoplastic neurologic disorder, 03/06/20 9:10:00 CST  Miscellaneous Lab Test, Routine collect, Blood, *Est. 03/08/20 3:00:00 CDT, Order for future visit, Send Out [facility] - Paraneoplastic Reflexive Panel [id number], Lab Collect, *Est. Stop date 03/08/20 3:00:00 CDT, Muscle weakness  Arthralgia, Other  Rheumatoid Factor IgM, IgG, IgA by [id number], Routine collect, 03/06/20 9:03:00 CST, Blood, Order for future visit, Stop date 03/06/20 9:03:00 CST, Lab Collect, Muscle weakness  Arthralgia, 03/06/20 9:03:00 CST  RNP and Smith Antibodies, [id number], Routine collect, 03/06/20 9:02:00 CST, Blood, Order for future visit, Stop date 03/06/20 9:02:00 CST, Lab Collect, Muscle weakness  Arthralgia, 03/06/20 9:02:00 CST  Sedimentation Rate, Routine collect, 03/06/20 9:00:00 CST, Blood, Order for future visit, Stop date 03/06/20 9:00:00 CST, Lab Collect, Muscle weakness  Arthralgia, 03/06/20 9:00:00 CST  SSA and SSB IgG [id number], Routine collect, 03/06/20 9:02:00 CST, Blood, Order for future visit, Stop date 03/06/20 9:02:00 CST, Lab Collect, Muscle weakness  Arthralgia, 03/06/20 9:02:00 CST  ?  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 03/07/20 3:00:00 CST, Blood, Order for future visit, *Est. Stop date 03/07/20 3:00:00 CST, Lab Collect, Muscle weakness  Arthralgia  Paraneoplastic neurologic disorder, 03/06/20 9:10:00 CST  ?    Problem List/Past Medical History  Ongoing  Arthralgia  Diabetes mellitus  Muscle weakness  Thyroid disease  Historical  Pregnant  Pregnant  Procedure/Surgical History  Biopsy Muscle (None) (2019)  Biopsy, muscle; deep (2019)  Excision of Right Upper Leg Muscle, Open Approach, Diagnostic (2019)  Diabetic retinal eye exam   Medications  levothyroxine 88 mcg (0.088 mg) oral tablet, 88 mcg= 1 tab(s), Oral, Daily, 4 refills  Mestinon 60 mg oral tablet, See Instructions, 4 refills  simvastatin 20 mg oral tablet, 20 mg= 1 tab(s), Oral, Once a day (at bedtime), 2 refills  Allergies  Lortab?(itching)  codeine?(itching)  penicillins?(itch and swell)  Social History  Abuse/Neglect  No, No, Yes, 2020  Alcohol  Past, 2020  Employment/School  self-employed, 2020  Exercise  Exercise type: denies., 2020  Home/Environment  Lives with Children. Living situation: Home/Independent., 2020  Nutrition/Health  Regular, Good, 2020  Sexual  Spiritual/Cultural  Taoism, 2020  Substance Use  Never, 2020  Tobacco  Never (less than 100 in lifetime), N/A, 2020  Family History  Autoimmune disease: Mother.  Congestive heart disease.: Brother.  : Mother and Father.  Throat cancer: Father.  Sister: History is negative  Health Maintenance  Health Maintenance  ???Pending?(in the next year)  ??? ??OverDue  ??? ? ? ?Diabetes Maintenance-Urine Dipstick due??19??and every 1??year(s)  ??? ? ? ?Alcohol Misuse Screening due??19??and every 1??year(s)  ??? ? ? ?Tetanus Vaccine due??19??and every 10??year(s)  ??? ??Due?  ??? ? ? ?Aspirin Therapy for CVD Prevention due??20??and every 1??year(s)  ??? ? ? ?Cervical Cancer Screening due??20??and every?  ??? ? ? ?Colorectal Screening due??20??and every?  ??? ? ? ?Diabetes Maintenance-Eye Exam due??20??and every 1??year(s)  ??? ? ? ?Diabetes Maintenance-Foot Exam due??20??and every?  ??? ?  ? ?Diabetes Maintenance-Medication Prescribed due??03/06/20??and every 1??year(s)  ??? ? ? ?Diabetes Maintenance-Microalbumin due??03/06/20??Variable frequency  ??? ??Due In Future?  ??? ? ? ?Smoking Cessation (Diabetes) not due until??08/20/20??and every 2??year(s)  ??? ? ? ?Diabetes Maintenance-HgbA1c not due until??12/10/20??and every 1??year(s)  ??? ? ? ?Diabetes Maintenance-Fasting Lipid Profile not due until??12/10/20??and every 1??year(s)  ??? ? ? ?Diabetes Maintenance-Serum Creatinine not due until??12/11/20??and every 1??year(s)  ??? ? ? ?Depression Screening not due until??01/01/21??and every 1??year(s)  ??? ? ? ?Obesity Screening not due until??01/01/21??and every 1??year(s)  ???Satisfied?(in the past 1 year)  ??? ??Satisfied?  ??? ? ? ?ADL Screening on??03/06/20.??Satisfied by Chivo Tena  ??? ? ? ?Blood Pressure Screening on??03/06/20.??Satisfied by Chivo Tena  ??? ? ? ?Body Mass Index Check on??03/06/20.??Satisfied by Chivo Tena  ??? ? ? ?Breast Cancer Screening on??01/23/20.??Satisfied by Dayna Reed  ??? ? ? ?Depression Screening on??01/02/20.??Satisfied by Erika Zacarias LPN  ??? ? ? ?Diabetes Maintenance-Serum Creatinine on??12/11/19.??Satisfied by Maria A Patel  ??? ? ? ?Diabetes Screening on??12/11/19.??Satisfied by Maria A Patel  ??? ? ? ?Lipid Screening on??12/11/19.??Satisfied by Maria A Patel  ??? ? ? ?Obesity Screening on??03/06/20.??Satisfied by Chivo Tena  ?

## 2022-05-02 NOTE — HISTORICAL [facility]ER
This is a historical note converted from Lloyd. Formatting and pictures may have been removed.  Please reference Lloyd for original formatting and attached multimedia. Chief Complaint  Has questions about health  History of Present Illness  58-year-old white female presents for follow-up appointment.? Patient states she was seen in clinic in [place] on July 20, 2018 by Dr. Negrito Childers.? Patient brought in?her clinic visit note?which?states that there is no need for neurosurgical intervention?as they do not believe the disc bulging?at L2-3?explains her current symptoms.? Patient goes on to tell me that she has been experiencing?proximal muscle weakness?in bilateral upper and lower extremities for the past 6 years.? She requires?help getting out of a chair, in fact, she first noticed her muscle weakness when she?started?having difficulty getting out of her car.? Hanging her laundry causes her to become very?tired?and weak, and occasionally short of breath. Has also experienced skin nodules and hand edema though not currently present.  Review of Systems  ????Constitutional: No fever, No chills, No fatigue.  ?????Eye: No recent visual problem.  ?????Respiratory: No shortness of breath, No cough, No sputum production, No wheezing.  ?????Cardiovascular: No chest pain, No palpitations, No peripheral edema.  ?????Gastrointestinal: No nausea, No vomiting, No diarrhea, No constipation, No heartburn, No abdominal pain.  ?????Genitourinary: No dysuria.  ?????Endocrine: No excessive thirst, No polyuria, No cold intolerance, No heat intolerance.  ?????Musculoskeletal: No?back pain, No joint pain, No decreased range of motion, proximal muscle weakness.  ?????Integumentary: No rash, No pruritus.  ?????Neurologic: Alert and oriented X4, No numbness, No tingling, No headache.  ?????Psychiatric: No anxiety, No depression.  Physical Exam  Vitals & Measurements  T:?36.5? ?C (Oral)? HR:?70(Peripheral)? RR:?18?  BP:?112/58?  HT:?172?cm? HT:?172?cm? HT:?172?cm? WT:?77.5?kg? WT:?77.5?kg? WT:?77.5?kg? BMI:?26.2?  General: Alert and oriented, No acute distress.  ?????Eye: Pupils are equal, round and reactive to light, Extraocular movements are intact, Normal conjunctiva.  ?????HENT: Normocephalic, Oral mucosa is moist, No pharyngeal erythema.  ?????Neck: Supple, Non-tender.  ?????Respiratory: Lungs are clear to auscultation, Respirations are non-labored.  ?????Cardiovascular: Normal rate, Regular rhythm, No murmur, Good pulses equal in all extremities, No edema.  ?????Gastrointestinal: Soft, Non-tender, Non-distended, Normal bowel sounds.  ?????Musculoskeletal: Normal range of motion,?bilateral upper extremities?reveal 5/5 strength,? bilateral lower extremities reveal 3/5?strength.? No tenderness to palpation?of bilateral upper or lower extremity muscle groups.  ?????Integumentary: Warm, Dry.  ?????Neurologic: Alert, Oriented.  ?????Cognition and Speech: Oriented, Speech clear and coherent.  ?????Psychiatric: Cooperative, Appropriate mood & affect.  Assessment/Plan  Proximal muscle weakness  Ordered:  Aldolase-[facility] [id number], Routine collect, 08/21/18 15:56:00 CDT, Blood, Stop date 08/21/18 15:56:00 CDT, Lab Collect, Proximal muscle weakness, 08/21/18 15:56:00 CDT  Complete Pulmonary Function Test, *Est. 08/21/18 3:00:00 CDT, Order for future visit, Proximal muscle weakness  Dyspnea on exertion, [facility]  Creatine Kinase, Routine collect, 08/21/18 15:56:00 CDT, Blood, Stop date 08/21/18 15:56:00 CDT, Lab Collect, Proximal muscle weakness, 08/21/18 15:56:00 CDT  External Referral, Proximal muscle weakness, PM&R, [facility], EMG/NCS of 4 extremities, 08/21/18 15:53:00 CDT, Proximal muscle weakness  Internal Referral to General Surgery ([facility]), Proximal muscle biopsy, *Est. 09/20/18 3:00:00 CDT, Future Visit?, Proximal muscle weakness  Lupus Analysis-[facility] [id number], Routine collect, 08/21/18 15:56:00  CDT, Blood, Stop date 08/21/18 15:56:00 CDT, Lab Collect, Proximal muscle weakness, 08/21/18 15:56:00 CDT  ?  ?  1. ?Proximal muscle weakness with associated dyspnea on exertion: Repeat DUGLAS panel today, CK and aldolase?level.  Referral to general surgery clinic for proximal muscle biopsy; differential diagnoses include polymyositis, dermatomyositis, polymyalgia rheumatica, etc.  PFTs within 4 weeks.  Referral to [facility] PM&R clinic for EMG/NCS x4 extremities.  ?  Keep scheduled follow-up appointment on November 30, 2018.  Patient voiced understanding.   Problem List/Past Medical History  Ongoing  Diabetes mellitus type 2  Thyroid disease  Historical  No qualifying data  Procedure/Surgical History  Diabetic retinal eye exam   Medications  levothyroxine 88 mcg (0.088 mg) oral tablet, 88 mcg= 1 tab(s), Oral, Daily  Allergies  Lortab?(itching)  codeine?(itching)  penicillins?(itch and swell)  Social History  Alcohol  Past, 07/16/2018  Employment/School  Employed, 07/16/2018  Home/Environment  Lives with Children. Living situation: Home/Independent. Alcohol abuse in household: No. Substance abuse in household: No. Smoker in household: No. Feels unsafe at home: No. Safe place to go: Yes. Family/Friends available for support: Yes. Concern for family members at home: No. Major illness in household: No., 07/16/2018  Nutrition/Health  Regular, Sleeping concerns: No. Feels highly stressed: No., 07/16/2018  Sexual  Substance Abuse  Never, 03/26/2018  Tobacco  Never smoker Use:., 07/16/2018  Family History  Autoimmune disease: Mother.  Congestive heart disease.: Brother.  Throat cancer: Father.  Health Maintenance  Health Maintenance  ???Pending?(in the next year)  ??? ??Due?  ??? ? ? ?Aspirin Therapy for CVD Prevention due??08/21/18??and every 1??year(s)  ??? ? ? ?Breast Cancer Screening due??08/21/18??and every?  ??? ? ? ?Cervical Cancer Screening due??08/21/18??and every?  ??? ? ? ?Colorectal Screening due??08/21/18??and  every?  ??? ? ? ?Diabetes Maintenance-Eye Exam due??08/21/18??and every 1??year(s)  ??? ? ? ?Diabetes Maintenance-Fasting Lipid Profile due??08/21/18??and every?  ??? ? ? ?Diabetes Maintenance-Foot Exam due??08/21/18??and every?  ??? ? ? ?Diabetes Maintenance-HgbA1c due??08/21/18??and every?  ??? ? ? ?Diabetes Maintenance-Medication Prescribed due??08/21/18??and every 1??year(s)  ??? ? ? ?Diabetes Maintenance-Microalbumin due??08/21/18??Variable frequency  ??? ? ? ?Diabetes Screening due??08/21/18??and every?  ??? ? ? ?Influenza Vaccine due??08/21/18??and every?  ??? ? ? ?Lipid Screening due??08/21/18??and every?  ??? ??Postponed?  ??? ? ? ?Alcohol Misuse Screening due??07/16/19??and every 1??year(s)  ??? ? ? ?Tetanus Vaccine due??07/16/19??and every 10??year(s)  ??? ??Due In Future?  ??? ? ? ?Diabetes Maintenance-Serum Creatinine not due until??03/26/19??and every 1??year(s)  ??? ? ? ?Smoking Cessation not due until??03/28/19??and every 1??year(s)  ??? ? ? ?Diabetes Maintenance-Urine Dipstick not due until??03/28/19??and every 1??year(s)  ??? ? ? ?Blood Pressure Screening not due until??07/16/19??and every 1??year(s)  ??? ? ? ?Depression Screening not due until??07/16/19??and every 1??year(s)  ??? ? ? ?Body Mass Index Check not due until??08/07/19??and every 1??year(s)  ??? ? ? ?Obesity Screening not due until??08/07/19??and every 1??year(s)  ???Satisfied?(in the past 1 year)  ??? ??Satisfied?  ??? ? ? ?Blood Pressure Screening on??08/07/18.??Satisfied by Laurel Rod RN.  ??? ? ? ?Body Mass Index Check on??07/16/18.??Satisfied by Misty Olivier LPN  ??? ? ? ?Breast Cancer Screening on??01/15/19.??Satisfied by Mari Evans MD  ??? ? ? ?Depression Screening on??07/16/18.??Satisfied by Misty Olivier LPN  ??? ? ? ?Diabetes Maintenance-HgbA1c on??10/16/18.??Satisfied by Mari Evans MD  ??? ? ? ?Diabetes Maintenance-Serum Creatinine on??03/26/18.??Satisfied by Le Medina  ??? ? ? ?Diabetes  Maintenance-Urine Dipstick on??03/28/18.??Satisfied by Marcela Appiah  ??? ? ? ?Diabetes Screening on??10/16/18.??Satisfied by Mari Evans MD  ??? ? ? ?Obesity Screening on??08/07/18.??Satisfied by Rhianna Hensley RN  ??? ? ? ?Smoking Cessation on??03/28/18.??Satisfied by Trevor Reed RN  ??? ? ? ?Smoking Cessation (Coronary Artery Disease) on??03/28/18.??Satisfied by Trevor Reed RN  ??? ? ? ?Smoking Cessation (Diabetes) on??03/28/18.??Satisfied by Trevor Reed RN  ??? ??Postponed?  ??? ? ? ?Alcohol Misuse Screening until??07/16/18.??Recorded for Charline LPN, Misty M??Reason: Temporary contraindication - reschedule  ??? ? ? ?Tetanus Vaccine until??07/16/18.??Recorded for Charlineиван JACKSON Misty M??Reason: Postpone due to refusal  ?  ?  Diagnostic Results  (03/28/2018 13:59 CDT XR Chest 2 Views)  Reason For Exam  Chest Pain  ?  Radiology Report  ?  CLINICAL: ?Chest pain.  ?  COMPARISON: None available.  ?  FINDINGS: ?Cardiopericardial silhouette is within normal limits.?  Lungs are without dense focal or segmental consolidation, congestion,  pleural effusion or pneumothorax. ?  ?  IMPRESSION:  ?  NO ACUTE CARDIOPULMONARY PROCESS IDENTIFIED.?  ?  ?  Signature Line  Electronically Signed By: Jorge Lin MD  Date/Time Signed: 03/28/2018 14:00  ? [1]     [1]?XR Chest 2 Views; Jorge Lin MD 03/28/2018 13:59 CDT

## 2022-05-05 ENCOUNTER — TELEPHONE (OUTPATIENT)
Dept: GYNECOLOGY | Facility: CLINIC | Age: 62
End: 2022-05-05
Payer: MEDICAID

## 2022-05-05 NOTE — TELEPHONE ENCOUNTER
"----- Message from Caren Hernandez MA sent at 5/5/2022  2:18 PM CDT -----  Regarding: FW: Heart Concerns    ----- Message -----  From: Sydni Price  Sent: 5/5/2022   1:49 PM CDT  To: Cristina SANTANA Staff  Subject: Heart Concerns                                   Patient asking for nurse to call her because patient says her heart has been "popping" out of her chest and in her throat. She says this happens around 2pm everyday. Please call and advise.      "

## 2022-05-11 ENCOUNTER — CLINICAL SUPPORT (OUTPATIENT)
Dept: RESPIRATORY THERAPY | Facility: HOSPITAL | Age: 62
End: 2022-05-11
Attending: NURSE PRACTITIONER
Payer: MEDICAID

## 2022-05-11 DIAGNOSIS — R00.2 PALPITATIONS: Primary | ICD-10-CM

## 2022-05-11 DIAGNOSIS — R00.2 PALPITATIONS: ICD-10-CM

## 2022-05-11 PROCEDURE — 93005 ELECTROCARDIOGRAM TRACING: CPT

## 2022-07-18 PROBLEM — E07.9 DISORDER OF THYROID: Status: ACTIVE | Noted: 2022-07-18

## 2022-07-18 PROBLEM — G70.00 MYASTHENIA GRAVIS: Status: ACTIVE | Noted: 2022-07-18

## 2022-07-18 PROBLEM — G70.00 MYASTHENIA GRAVIS: Chronic | Status: ACTIVE | Noted: 2022-07-18

## 2022-07-18 PROBLEM — E11.9 DIABETES MELLITUS: Status: ACTIVE | Noted: 2022-07-18

## 2022-07-18 NOTE — PROGRESS NOTES
[facility] Neurology follow-up Office Visit Note    Last Visit Date:  March 18, 2022  Current Visit Date:  07/19/2022    Chief Complaint:     Chief Complaint   Patient presents with    Neurologic Problem     F/u weakness and bulbar movement ,states condition the same       History of Present Illness:      This is 62 y.o. female with history of hypothyroidism, DM II, who is referred for suspected generalized exertional weakness with proximal muscle and bulbar involvement.  During last visit, prednisone was discontinued.  Patient has seen [facility] - pending repeat stim. Had an episode sleep paralysis.     Age of Onset - [age]    Semiology - exertional SOB, proximal hip and shoulder weakness with exertion. Has trouble with swallowing for at least six years. Has trouble with ptosis for at least 3 year. Symptoms improve with rest. Symptoms impeding day to day activity.      Medications:     No current outpatient medications on file prior to visit.     No current facility-administered medications on file prior to visit.     Medication -  Mestinon 60mg TID (2/2020 - present) - only taking TID     Prior medications:  Prednisone 10mg every other day (9/28/2021 - present) - ineffective    Labs:     Results for orders placed or performed in visit on 03/28/22   Hemoglobin A1C   Result Value Ref Range    Hemoglobin A1c 5.5 <=7.0    Estimated Average Glucose 111.2    Comprehensive Metabolic Panel   Result Value Ref Range    Sodium Level 139 136 - 145    Potassium Level 4.6 3.5 - 5.1    Chloride 106 98 - 107    Carbon Dioxide 27 23 - 31    Glucose Level 101 82 - 115    Blood Urea Nitrogen 17.5 9.8 - 20.1    Creatinine 0.85 0.55 - 1.02    Calcium Level Total 9.9 8.7 - 10.5    Albumin Level 3.8 3.4 - 4.8    Protein Total 7.2 5.8 - 7.6    Globulin 3.4 2.4 - 3.5    Albumin/Globulin Ratio 1.1 1.1 - 2.0    Alkaline Phosphatase 81 40 - 150    Bilirubin Total 0.5 <=1.5    Bilirubin Direct 0.2 0.0 - 0.5    Bilirubin Indirect 0.30 0.00 - 0.80     Aspartate Aminotransferase 17 5 - 34    Alanine Aminotransferase 20 0 - 55    Hemolysis 1     Icterus 1     Lipemia 26    GFR, Estimated   Result Value Ref Range    Estimated GFR- 87 >=90    Estimated GFR-Non African American 72 >=90   Lipid Panel   Result Value Ref Range    Cholesterol Total 200 <=200    HDL Cholesterol 52 35 - 60    Triglyceride 89 37 - 140    Very Low Density Lipoprotein 18     Cholesterol/HDL Ratio 4 0 - 5    LDL Cholesterol 130.00 50.00 - 140.00   TSH   Result Value Ref Range    Thyroid Stimulating Hormone 0.3666 0.3500 - 4.9400       Studies:       Workup -  Anti-AChR Bind Ab  - [facility] 12/11/2019 - 0   Anti-AChR Blocking Ab  - [facility] 12/11/2019 - 0   Anti-MuSK Ab - [facility] 1/17/2020 - 0   Anti-Titin Ab  - [facility] 1/17/2020 - 0  LES Panel 1/17/2020 - negative   Anti- Striated Muscle Ab  a1/17/2020 - negative  Paraneoplastic panel 3/6/2020 - negative    Muscle Biopsy 12/2019 -  Type II myofiber atrophy is a nonspecific finding that may be present in  the setting of chronic steroid use, Cushings syndrome, disuse atrophy,  myasthenia gravis, upper motor neuron disease, congenital hypotonia,  hyperparathyroidism, paraneoplastic syndromes, connective tissue  disorders and alcoholic myopathy.    The etiology of this acute denervation is unclear. The differential  diagnosis of acute denervation would include but is not limited to motor  neuron diseases, plexitis, radiculopathy, complications of diabetes,  acute neuropathies, muscle trauma or critical illness polyneuropathy.  Clinical correlation is required.    MRI Lumbar 6/18/2018 - I have reviewed the study independently and with the patient. moderate spinal stenosis noted at L2-3, no significant neuroforaminal stenosis noted.    MRI Brain +/- Marlo 5/2018 - I have reviewed the study independently and with the patient. Unremarkable MRI Brain.    Review of Systems:     Review of Systems   All other systems reviewed and are  negative.      Physical Exams:     Vitals:    07/19/22 0943   BP: 100/63   Pulse: 87   Resp: 18   Temp: 98.2 °F (36.8 °C)       Physical Exam  Vitals and nursing note reviewed.   Constitutional:       Appearance: Normal appearance.   HENT:      Head: Normocephalic and atraumatic.      Nose: Nose normal.      Mouth/Throat:      Mouth: Mucous membranes are moist.      Pharynx: Oropharynx is clear.   Eyes:      Conjunctiva/sclera: Conjunctivae normal.   Cardiovascular:      Rate and Rhythm: Normal rate and regular rhythm.      Pulses: Normal pulses.   Pulmonary:      Effort: Pulmonary effort is normal.      Breath sounds: Normal breath sounds.   Abdominal:      General: Abdomen is flat.   Musculoskeletal:         General: Normal range of motion.      Cervical back: Normal range of motion.   Skin:     General: Skin is warm.   Neurological:      Mental Status: She is alert.         Comprehensive Neurological Exam:  Mental Status: Alert Oriented to Self, Date, and Place. Naming, repetition, reading, and writing wnl. Comprehension wnl. No dysarthria.   CN II-XII - CN I Deferred, JACI, Fundus sharp, non-pallor, no RAPD, VA grossly normal to finger counting at 6ft, assess fundus, CN V1-3, visual field due to nature of visit, VA grossly normal to motion and finger counting, ptosis OS 0.3 cm without latent ptosis, with myokymia, LT/PP symmetric, intact in CN V1-V3 distribution b/l, masseter symmetric b/l, T/U midline, Shoulder shrug symmetric b/l, Hearing grossly intact b/l, VFFC, Face Symmetric.  Motor - Tone and Bulk nml throughout, No involuntary movements, 4/5 to confrontation with limited effort, 4/5 in Neck Extension and Flexion, FFM nml b/l, No pronator drift.  Sensory - LT/PP/Temp/Proprioception/Vibration symmetric intact throughout.  Reflexes - 2+ Throughout, Babinski negative.  Cerebellar Exam - FNF wnl b/l no intention tremor.  Romberg - negative.  Gait - waddling gait       Assessment:     This is 62 y.o. female  with history of hypothyroidism, DM II, who is referred for suspected generalized exertional weakness with proximal muscle and bulbar involvement. .      Problem List Items Addressed This Visit        Neuro    Myasthenia gravis - Primary (Chronic)          Plan:     [] pending [facility] Referral. May need repeat stim and further Ab testing. Consider Mitochondrial Testing  [] c/w Mestinon 60mg TID      RTC 4 months      I have explained the treatment plan, diagnosis, and prognosis to patient. All questions are answered to the best of my knowledge.     Face to face time 30 minutes, including documentation, chart review, counseling, education, review of test results, relevant medical records, and coordination of care.   I have explained the treatment plan, diagnosis, and prognosis to patient. All questions are answered to the best of my knowledge.       Nicole Singer MD   General Neurology  07/19/2022

## 2022-07-19 ENCOUNTER — OFFICE VISIT (OUTPATIENT)
Dept: NEUROLOGY | Facility: CLINIC | Age: 62
End: 2022-07-19
Payer: MEDICAID

## 2022-07-19 VITALS
HEART RATE: 87 BPM | RESPIRATION RATE: 18 BRPM | OXYGEN SATURATION: 97 % | BODY MASS INDEX: 26.19 KG/M2 | DIASTOLIC BLOOD PRESSURE: 63 MMHG | TEMPERATURE: 98 F | WEIGHT: 172.81 LBS | SYSTOLIC BLOOD PRESSURE: 100 MMHG | HEIGHT: 68 IN

## 2022-07-19 DIAGNOSIS — G70.00 MYASTHENIA GRAVIS: Primary | Chronic | ICD-10-CM

## 2022-07-19 PROCEDURE — 99214 PR OFFICE/OUTPT VISIT, EST, LEVL IV, 30-39 MIN: ICD-10-PCS | Mod: S$PBB,,, | Performed by: PSYCHIATRY & NEUROLOGY

## 2022-07-19 PROCEDURE — 1159F MED LIST DOCD IN RCRD: CPT | Mod: CPTII,,, | Performed by: PSYCHIATRY & NEUROLOGY

## 2022-07-19 PROCEDURE — 99213 OFFICE O/P EST LOW 20 MIN: CPT | Mod: PBBFAC | Performed by: PSYCHIATRY & NEUROLOGY

## 2022-07-19 PROCEDURE — 3074F SYST BP LT 130 MM HG: CPT | Mod: CPTII,,, | Performed by: PSYCHIATRY & NEUROLOGY

## 2022-07-19 PROCEDURE — 3008F BODY MASS INDEX DOCD: CPT | Mod: CPTII,,, | Performed by: PSYCHIATRY & NEUROLOGY

## 2022-07-19 PROCEDURE — 1159F PR MEDICATION LIST DOCUMENTED IN MEDICAL RECORD: ICD-10-PCS | Mod: CPTII,,, | Performed by: PSYCHIATRY & NEUROLOGY

## 2022-07-19 PROCEDURE — 3078F DIAST BP <80 MM HG: CPT | Mod: CPTII,,, | Performed by: PSYCHIATRY & NEUROLOGY

## 2022-07-19 PROCEDURE — 99214 OFFICE O/P EST MOD 30 MIN: CPT | Mod: S$PBB,,, | Performed by: PSYCHIATRY & NEUROLOGY

## 2022-07-19 PROCEDURE — 3074F PR MOST RECENT SYSTOLIC BLOOD PRESSURE < 130 MM HG: ICD-10-PCS | Mod: CPTII,,, | Performed by: PSYCHIATRY & NEUROLOGY

## 2022-07-19 PROCEDURE — 3078F PR MOST RECENT DIASTOLIC BLOOD PRESSURE < 80 MM HG: ICD-10-PCS | Mod: CPTII,,, | Performed by: PSYCHIATRY & NEUROLOGY

## 2022-07-19 PROCEDURE — 3008F PR BODY MASS INDEX (BMI) DOCUMENTED: ICD-10-PCS | Mod: CPTII,,, | Performed by: PSYCHIATRY & NEUROLOGY

## 2022-07-19 RX ORDER — SIMVASTATIN 20 MG/1
20 TABLET, FILM COATED ORAL
COMMUNITY
Start: 2022-04-05 | End: 2022-09-16 | Stop reason: SDUPTHER

## 2022-07-19 RX ORDER — LEVOTHYROXINE SODIUM 100 UG/1
100 TABLET ORAL
COMMUNITY
Start: 2022-04-05 | End: 2022-09-16 | Stop reason: SDUPTHER

## 2022-07-19 RX ORDER — PYRIDOSTIGMINE BROMIDE 60 MG/1
60 TABLET ORAL 3 TIMES DAILY
Qty: 90 TABLET | Refills: 4 | Status: SHIPPED | OUTPATIENT
Start: 2022-07-19 | End: 2022-11-22

## 2022-07-19 RX ORDER — SULFAMETHOXAZOLE AND TRIMETHOPRIM 800; 160 MG/1; MG/1
1 TABLET ORAL 2 TIMES DAILY
COMMUNITY
Start: 2022-07-14 | End: 2022-11-22 | Stop reason: ALTCHOICE

## 2022-07-19 RX ORDER — GLIPIZIDE 5 MG/1
5 TABLET, FILM COATED, EXTENDED RELEASE ORAL
COMMUNITY
Start: 2022-04-05 | End: 2022-09-16 | Stop reason: SDUPTHER

## 2022-09-15 ENCOUNTER — HOSPITAL ENCOUNTER (OUTPATIENT)
Dept: RADIOLOGY | Facility: HOSPITAL | Age: 62
Discharge: HOME | End: 2022-09-15
Attending: INTERNAL MEDICINE
Payer: MEDICAID

## 2022-09-15 DIAGNOSIS — Z12.31 BREAST CANCER SCREENING BY MAMMOGRAM: ICD-10-CM

## 2022-09-15 PROCEDURE — 77067 SCR MAMMO BI INCL CAD: CPT | Mod: 26,,, | Performed by: RADIOLOGY

## 2022-09-15 PROCEDURE — 77063 BREAST TOMOSYNTHESIS BI: CPT | Mod: 26,,, | Performed by: RADIOLOGY

## 2022-09-15 PROCEDURE — 77067 MAMMO DIGITAL SCREENING BILAT WITH TOMO: ICD-10-PCS | Mod: 26,,, | Performed by: RADIOLOGY

## 2022-09-15 PROCEDURE — 77067 SCR MAMMO BI INCL CAD: CPT | Mod: TC

## 2022-09-15 PROCEDURE — 77063 MAMMO DIGITAL SCREENING BILAT WITH TOMO: ICD-10-PCS | Mod: 26,,, | Performed by: RADIOLOGY

## 2022-09-16 ENCOUNTER — OFFICE VISIT (OUTPATIENT)
Dept: FAMILY MEDICINE | Facility: CLINIC | Age: 62
End: 2022-09-16
Payer: MEDICAID

## 2022-09-16 DIAGNOSIS — E78.5 HYPERLIPIDEMIA, UNSPECIFIED HYPERLIPIDEMIA TYPE: ICD-10-CM

## 2022-09-16 DIAGNOSIS — E11.9 TYPE 2 DIABETES MELLITUS WITHOUT COMPLICATION, WITHOUT LONG-TERM CURRENT USE OF INSULIN: ICD-10-CM

## 2022-09-16 DIAGNOSIS — E03.9 HYPOTHYROIDISM, UNSPECIFIED TYPE: Primary | ICD-10-CM

## 2022-09-16 PROCEDURE — 1159F MED LIST DOCD IN RCRD: CPT | Mod: CPTII,95,, | Performed by: FAMILY MEDICINE

## 2022-09-16 PROCEDURE — 99203 PR OFFICE/OUTPT VISIT, NEW, LEVL III, 30-44 MIN: ICD-10-PCS | Mod: 95,,, | Performed by: FAMILY MEDICINE

## 2022-09-16 PROCEDURE — 99203 OFFICE O/P NEW LOW 30 MIN: CPT | Mod: 95,,, | Performed by: FAMILY MEDICINE

## 2022-09-16 PROCEDURE — 1159F PR MEDICATION LIST DOCUMENTED IN MEDICAL RECORD: ICD-10-PCS | Mod: CPTII,95,, | Performed by: FAMILY MEDICINE

## 2022-09-16 RX ORDER — GLIPIZIDE 5 MG/1
5 TABLET, FILM COATED, EXTENDED RELEASE ORAL
Qty: 90 TABLET | Refills: 3 | Status: SHIPPED | OUTPATIENT
Start: 2022-09-16 | End: 2022-11-22 | Stop reason: ALTCHOICE

## 2022-09-16 RX ORDER — SIMVASTATIN 20 MG/1
20 TABLET, FILM COATED ORAL NIGHTLY
Qty: 90 TABLET | Refills: 3 | Status: SHIPPED | OUTPATIENT
Start: 2022-09-16 | End: 2023-06-13

## 2022-09-16 RX ORDER — LEVOTHYROXINE SODIUM 100 UG/1
100 TABLET ORAL
Qty: 90 TABLET | Refills: 3 | Status: SHIPPED | OUTPATIENT
Start: 2022-09-16 | End: 2023-06-13

## 2022-09-16 NOTE — PROGRESS NOTES
Established Patient - Audio Only Telehealth Visit     The patient location is: Home  The chief complaint leading to consultation is: Establish care  Visit type: Virtual visit with audio only (telephone)  Total time spent with patient: 21 mins       The reason for the audio only service rather than synchronous audio and video virtual visit was related to technical difficulties or patient preference/necessity.     Each patient to whom I provide medical services by telemedicine is:  (1) informed of the relationship between the physician and patient and the respective role of any other health care provider with respect to management of the patient; and (2) notified that they may decline to receive medical services by telemedicine and may withdraw from such care at any time. Patient verbally consented to receive this service via voice-only telephone call.       HPI:  61 yo F presents to establish care; previously followed with Dr. Evans. Patient has a PMH HTN, DM, hypothyroid. Currently being evaluated for possible mitochondrial disease in [facility]. Referred by neurology; previous thought weakness was due to myasthenia gravis. Currently in bed resting     Assessment and plan:    Hypothyroid  HLD  DM  Weakness MG v mitochondrial disease    Medications refilled  Keep follow up neurology  Labs before next visit                                    This service was not originating from a related E/M service provided within the previous 7 days nor will  to an E/M service or procedure within the next 24 hours or my soonest available appointment.  Prevailing standard of care was able to be met in this audio-only visit.

## 2022-10-07 DIAGNOSIS — R35.0 URINARY FREQUENCY: ICD-10-CM

## 2022-10-07 DIAGNOSIS — R31.9 HEMATURIA SYNDROME: Primary | ICD-10-CM

## 2022-10-17 ENCOUNTER — HOSPITAL ENCOUNTER (OUTPATIENT)
Dept: RADIOLOGY | Facility: HOSPITAL | Age: 62
Discharge: HOME | End: 2022-10-17
Attending: NURSE PRACTITIONER
Payer: MEDICAID

## 2022-10-17 DIAGNOSIS — R31.9 HEMATURIA SYNDROME: ICD-10-CM

## 2022-10-17 DIAGNOSIS — R35.0 URINARY FREQUENCY: ICD-10-CM

## 2022-10-17 PROCEDURE — 76770 US EXAM ABDO BACK WALL COMP: CPT | Mod: TC

## 2022-11-21 NOTE — PROGRESS NOTES
[facility] Neurology Follow Up Office Visit Note    Last Visit Date: 7/19/2022  Current Visit Date:  11/21/2022    Chief Complaint:     Chief Complaint   Patient presents with    F/U Myasthenia Gravis-states no complaints; feels a little        History of Present Illness:      This is 62 y.o. female with history of hypothyroidism, DM II, who is referred for suspected generalized exertional weakness with proximal muscle and bulbar involvement.  Patient has since seen Dr. Vegas for repeat NCS with repeat stim and single fiber EMG.  Currently getting tested for CPEOs.      Age of Onset - 2013    Semiology - exertional SOB, proximal hip and shoulder weakness with exertion. Has trouble with swallowing for at least six years. Has trouble with ptosis for at least 3 year. Symptoms improve with rest. Symptoms impeding day to day activity.      Medications:     Current Outpatient Medications on File Prior to Visit   Medication Sig Dispense Refill    BACTRIM -160 mg Tab Take 1 tablet by mouth 2 (two) times daily.      glipiZIDE 5 MG TR24 Take 1 tablet (5 mg total) by mouth daily with breakfast. 90 tablet 3    levothyroxine (SYNTHROID) 100 MCG tablet Take 1 tablet (100 mcg total) by mouth before breakfast. 90 tablet 3    pyridostigmine (MESTINON) 60 mg Tab Take 1 tablet (60 mg total) by mouth 3 (three) times daily. 90 tablet 4    simvastatin (ZOCOR) 20 MG tablet Take 1 tablet (20 mg total) by mouth every evening. 90 tablet 3     No current facility-administered medications on file prior to visit.       Labs:     Results for orders placed or performed in visit on 03/28/22   Hemoglobin A1C   Result Value Ref Range    Hemoglobin A1c 5.5 <=7.0    Estimated Average Glucose 111.2    Comprehensive Metabolic Panel   Result Value Ref Range    Sodium Level 139 136 - 145    Potassium Level 4.6 3.5 - 5.1    Chloride 106 98 - 107    Carbon Dioxide 27 23 - 31    Glucose Level 101 82 - 115    Blood Urea Nitrogen 17.5 9.8 - 20.1     Creatinine 0.85 0.55 - 1.02    Calcium Level Total 9.9 8.7 - 10.5    Albumin Level 3.8 3.4 - 4.8    Protein Total 7.2 5.8 - 7.6    Globulin 3.4 2.4 - 3.5    Albumin/Globulin Ratio 1.1 1.1 - 2.0    Alkaline Phosphatase 81 40 - 150    Bilirubin Total 0.5 <=1.5    Bilirubin Direct 0.2 0.0 - 0.5    Bilirubin Indirect 0.30 0.00 - 0.80    Aspartate Aminotransferase 17 5 - 34    Alanine Aminotransferase 20 0 - 55    Hemolysis 1     Icterus 1     Lipemia 26    GFR, Estimated   Result Value Ref Range    Estimated GFR- 87 >=90    Estimated GFR-Non African American 72 >=90   Lipid Panel   Result Value Ref Range    Cholesterol Total 200 <=200    HDL Cholesterol 52 35 - 60    Triglyceride 89 37 - 140    Very Low Density Lipoprotein 18     Cholesterol/HDL Ratio 4 0 - 5    LDL Cholesterol 130.00 50.00 - 140.00   TSH   Result Value Ref Range    Thyroid Stimulating Hormone 0.3666 0.3500 - 4.9400       Studies:      Anti-AChR Bind Ab [facility] 12/11/2019 - 0   Anti-AChR Blocking Ab [facility] 12/11/2019 - 0   Anti-MuSK Ab [facility] 1/17/2020 - 0   Anti-Titin Ab [facility] 1/17/2020 - 0  LES Panel 1/17/2020 - negative   Anti- Striated Muscle Ab  a1/17/2020 - negative  Paraneoplastic panel 3/6/2020 - negative    Muscle Biopsy 12/2019 -  Type II myofiber atrophy is a nonspecific finding that may be present in  the setting of chronic steroid use, Cushings syndrome, disuse atrophy,  myasthenia gravis, upper motor neuron disease, congenital hypotonia,  hyperparathyroidism, paraneoplastic syndromes, connective tissue  disorders and alcoholic myopathy.    The etiology of this acute denervation is unclear. The differential  diagnosis of acute denervation would include but is not limited to motor  neuron diseases, plexitis, radiculopathy, complications of diabetes,  acute neuropathies, muscle trauma or critical illness polyneuropathy.  Clinical correlation is required.    MRI Lumbar 6/18/2018 - I have reviewed the study  independently and with the patient. moderate spinal stenosis noted at L2-3, no significant neuroforaminal stenosis noted.    MRI Brain +/- Marlo 5/2018 - I have reviewed the study independently and with the patient. Unremarkable MRI Brain.    EMG/NCS Repeat Stim and SF EMG 9/2/2022:  Normal NCS of lower extremity sensory and motor distributions, EMG of the UE and LE did not show any active or chronic denervation and was normal without myopathic findings. SF EMG showed normal amount of jitter without blocking. Rep stim was not consistent with muscle junction abnormality.     Review of Systems:     All other systems reviewed and are negative.    Physical Exams:     Vitals:    11/22/22 1008   BP: 125/80   Pulse: 71   Resp: 16   Temp: 97.6 °F (36.4 °C)       Vitals and nursing note reviewed.   Constitutional:       Appearance: Normal appearance.   HENT:      Head: Normocephalic and atraumatic.      Nose: Nose normal.      Mouth/Throat:      Mouth: Mucous membranes are moist.      Pharynx: Oropharynx is clear.   Eyes:      Conjunctiva/sclera: Conjunctivae normal.   Cardiovascular:      Rate and Rhythm: Normal rate and regular rhythm.      Pulses: Normal pulses.   Pulmonary:      Effort: Pulmonary effort is normal.      Breath sounds: Normal breath sounds.   Abdominal:      General: Abdomen is flat.   Musculoskeletal:         General: Normal range of motion.      Cervical back: Normal range of motion.   Skin:     General: Skin is warm.   Neurological:      Mental Status: She is alert.          Comprehensive Neurological Exam:  Mental Status: Alert Oriented to Self, Date, and Place. Naming, repetition, reading, and writing wnl. Comprehension wnl. No dysarthria.   CN II-XII - CN I Deferred, JACI, Fundus sharp, non-pallor, no RAPD, VA grossly normal to finger counting at 6ft, assess fundus, CN V1-3, visual field due to nature of visit, VA grossly normal to motion and finger counting, ptosis OS 0.3 cm without latent ptosis,  with myokymia, EOMI, LT/PP symmetric, intact in CN V1-V3 distribution b/l, masseter symmetric b/l, T/U midline, Shoulder shrug symmetric b/l, Hearing grossly intact b/l, VFFC, Face Symmetric.  Motor - Tone and Bulk nml throughout, No involuntary movements, 5/5 to confrontation with limited effort, 5/5 in Neck Extension and Flexion, FFM nml b/l, No pronator drift.  Sensory - LT/PP/Temp/Proprioception/Vibration symmetric intact throughout.  Reflexes - 2+ Throughout, Babinski negative.  Cerebellar Exam - FNF wnl b/l no intention tremor.  Romberg - negative.  Gait - waddling gait        Assessment:     This is 62 y.o. female with history of hypothyroidism, DM II, who is referred for possible CPEO.    Problem List Items Addressed This Visit          Ophtho    Ptosis of left eyelid - Primary       Plan:     [] stop Mestinon 60mg TID    RTC 6 months      I have explained the treatment plan, diagnosis, and prognosis to patient. All questions are answered to the best of my knowledge.     Face to face time 30 minutes, including documentation, chart review, counseling, education, review of test results, relevant medical records, and coordination of care.   I have explained the treatment plan, diagnosis, and prognosis to patient. All questions are answered to the best of my knowledge.       Nicole Singer MD   General Neurology  11/22/2022

## 2022-11-22 ENCOUNTER — OFFICE VISIT (OUTPATIENT)
Dept: NEUROLOGY | Facility: CLINIC | Age: 62
End: 2022-11-22
Payer: MEDICAID

## 2022-11-22 VITALS
OXYGEN SATURATION: 100 % | TEMPERATURE: 98 F | RESPIRATION RATE: 16 BRPM | BODY MASS INDEX: 23.55 KG/M2 | HEIGHT: 68 IN | SYSTOLIC BLOOD PRESSURE: 125 MMHG | HEART RATE: 71 BPM | DIASTOLIC BLOOD PRESSURE: 80 MMHG | WEIGHT: 155.38 LBS

## 2022-11-22 DIAGNOSIS — H02.402 PTOSIS OF LEFT EYELID: Primary | ICD-10-CM

## 2022-11-22 PROBLEM — G70.00 MYASTHENIA GRAVIS: Status: RESOLVED | Noted: 2022-07-18 | Resolved: 2022-11-22

## 2022-11-22 PROCEDURE — 3008F PR BODY MASS INDEX (BMI) DOCUMENTED: ICD-10-PCS | Mod: CPTII,,, | Performed by: PSYCHIATRY & NEUROLOGY

## 2022-11-22 PROCEDURE — 3079F PR MOST RECENT DIASTOLIC BLOOD PRESSURE 80-89 MM HG: ICD-10-PCS | Mod: CPTII,,, | Performed by: PSYCHIATRY & NEUROLOGY

## 2022-11-22 PROCEDURE — 3008F BODY MASS INDEX DOCD: CPT | Mod: CPTII,,, | Performed by: PSYCHIATRY & NEUROLOGY

## 2022-11-22 PROCEDURE — 3074F PR MOST RECENT SYSTOLIC BLOOD PRESSURE < 130 MM HG: ICD-10-PCS | Mod: CPTII,,, | Performed by: PSYCHIATRY & NEUROLOGY

## 2022-11-22 PROCEDURE — 99214 OFFICE O/P EST MOD 30 MIN: CPT | Mod: S$PBB,,, | Performed by: PSYCHIATRY & NEUROLOGY

## 2022-11-22 PROCEDURE — 3074F SYST BP LT 130 MM HG: CPT | Mod: CPTII,,, | Performed by: PSYCHIATRY & NEUROLOGY

## 2022-11-22 PROCEDURE — 99214 PR OFFICE/OUTPT VISIT, EST, LEVL IV, 30-39 MIN: ICD-10-PCS | Mod: S$PBB,,, | Performed by: PSYCHIATRY & NEUROLOGY

## 2022-11-22 PROCEDURE — 1159F MED LIST DOCD IN RCRD: CPT | Mod: CPTII,,, | Performed by: PSYCHIATRY & NEUROLOGY

## 2022-11-22 PROCEDURE — 3079F DIAST BP 80-89 MM HG: CPT | Mod: CPTII,,, | Performed by: PSYCHIATRY & NEUROLOGY

## 2022-11-22 PROCEDURE — 99213 OFFICE O/P EST LOW 20 MIN: CPT | Mod: PBBFAC | Performed by: PSYCHIATRY & NEUROLOGY

## 2022-11-22 PROCEDURE — 1159F PR MEDICATION LIST DOCUMENTED IN MEDICAL RECORD: ICD-10-PCS | Mod: CPTII,,, | Performed by: PSYCHIATRY & NEUROLOGY

## 2022-11-22 RX ORDER — ERGOCALCIFEROL 1.25 MG/1
50000 CAPSULE ORAL
COMMUNITY

## 2023-04-20 DIAGNOSIS — R31.29 MICROSCOPIC HEMATURIA: Primary | ICD-10-CM

## 2023-04-21 ENCOUNTER — TELEPHONE (OUTPATIENT)
Dept: NEUROLOGY | Facility: CLINIC | Age: 63
End: 2023-04-21
Payer: MEDICAID

## 2023-04-21 ENCOUNTER — LAB VISIT (OUTPATIENT)
Dept: LAB | Facility: HOSPITAL | Age: 63
End: 2023-04-21
Attending: UROLOGY
Payer: MEDICAID

## 2023-04-21 ENCOUNTER — CLINICAL SUPPORT (OUTPATIENT)
Dept: RESPIRATORY THERAPY | Facility: HOSPITAL | Age: 63
End: 2023-04-21
Attending: ANESTHESIOLOGY
Payer: MEDICAID

## 2023-04-21 DIAGNOSIS — R31.29 MICROSCOPIC HEMATURIA: ICD-10-CM

## 2023-04-21 DIAGNOSIS — R31.29 MICROSCOPIC HEMATURIA: Primary | ICD-10-CM

## 2023-04-21 LAB
ANION GAP SERPL CALC-SCNC: 7 MEQ/L
BUN SERPL-MCNC: 17 MG/DL (ref 9.8–20.1)
CALCIUM SERPL-MCNC: 9.4 MG/DL (ref 8.4–10.2)
CHLORIDE SERPL-SCNC: 108 MMOL/L (ref 98–107)
CO2 SERPL-SCNC: 26 MMOL/L (ref 23–31)
CREAT SERPL-MCNC: 0.86 MG/DL (ref 0.55–1.02)
CREAT/UREA NIT SERPL: 20
ERYTHROCYTE [DISTWIDTH] IN BLOOD BY AUTOMATED COUNT: 14.9 % (ref 11.5–17)
GFR SERPLBLD CREATININE-BSD FMLA CKD-EPI: >60 MLS/MIN/1.73/M2
GLUCOSE SERPL-MCNC: 100 MG/DL (ref 82–115)
HCT VFR BLD AUTO: 41.4 % (ref 37–47)
HGB BLD-MCNC: 12.8 G/DL (ref 12–16)
MCH RBC QN AUTO: 29 PG (ref 27–31)
MCHC RBC AUTO-ENTMCNC: 30.9 G/DL (ref 33–36)
MCV RBC AUTO: 93.7 FL (ref 80–94)
PLATELET # BLD AUTO: 232 X10(3)/MCL (ref 130–400)
PMV BLD AUTO: 10.5 FL (ref 7.4–10.4)
POTASSIUM SERPL-SCNC: 4.2 MMOL/L (ref 3.5–5.1)
RBC # BLD AUTO: 4.42 X10(6)/MCL (ref 4.2–5.4)
SODIUM SERPL-SCNC: 141 MMOL/L (ref 136–145)
WBC # SPEC AUTO: 4.8 X10(3)/MCL (ref 4.5–11.5)

## 2023-04-21 PROCEDURE — 85027 COMPLETE CBC AUTOMATED: CPT

## 2023-04-21 PROCEDURE — 80048 BASIC METABOLIC PNL TOTAL CA: CPT

## 2023-04-21 PROCEDURE — 36415 COLL VENOUS BLD VENIPUNCTURE: CPT

## 2023-04-21 PROCEDURE — 93005 ELECTROCARDIOGRAM TRACING: CPT

## 2023-04-21 PROCEDURE — 93010 ELECTROCARDIOGRAM REPORT: CPT | Mod: ,,, | Performed by: INTERNAL MEDICINE

## 2023-04-21 PROCEDURE — 93010 EKG 12-LEAD: ICD-10-PCS | Mod: ,,, | Performed by: INTERNAL MEDICINE

## 2023-04-21 NOTE — TELEPHONE ENCOUNTER
----- Message from Theodora Lawson sent at 4/21/2023 10:58 AM CDT -----  Pt called to speak to nurse or Dr Singer. Pt is having cystoscopy on 4/25 and need to know if she needs to start taking the Myasthenia gravis medication again being that she will be being put to sleep.  Please advise  Pt # [phone]

## 2023-04-21 NOTE — DISCHARGE INSTRUCTIONS
Do not eat or drink after midnight Monday 4-24-23--must have  upon discharge-- will receive a call Monday 4-24-23 between 1-4 pm with arrival time

## 2023-04-21 NOTE — TELEPHONE ENCOUNTER
Notified Ms Negron-Per Dr Singer, no need to restart/take Mestinon for upcoming surgery/procedure.    Ms Negron verbalized understanding   Refill dated for the 10th.

## 2023-04-21 NOTE — TELEPHONE ENCOUNTER
----- Message from Theodora Lawson sent at 4/21/2023 10:58 AM CDT -----  Pt called to speak to nurse or Dr Singer. Pt is having cystoscopy on 4/25 and need to know if she needs to start taking the Myasthenia gravis medication again being that she will be being put to sleep.  Please advise  Pt # [phone]

## 2023-04-24 ENCOUNTER — ANESTHESIA EVENT (OUTPATIENT)
Dept: SURGERY | Facility: HOSPITAL | Age: 63
End: 2023-04-24
Payer: MEDICAID

## 2023-04-24 NOTE — ANESTHESIA PREPROCEDURE EVALUATION
04/24/2023  Migdalia Moreno is a 63 y.o., female.      Pre-op Assessment    I have reviewed the Patient Summary Reports.       I have reviewed the Medications.     Review of Systems  Anesthesia Hx:  No problems with previous Anesthesia  Neg history of prior surgery. Denies Family Hx of Anesthesia complications.   Denies Personal Hx of Anesthesia complications.   Social:  Non-Smoker    Hematology/Oncology:  Hematology Normal   Oncology Normal     EENT/Dental:EENT/Dental Normal   Cardiovascular:   hyperlipidemia    Pulmonary:  Pulmonary Normal    Renal/:  Renal/ Normal     Hepatic/GI:  Hepatic/GI Normal    Musculoskeletal:   Movement Disorder   Neurological:  Neurology Normal    Endocrine:   Diabetes Hypothyroidism    Dermatological:  Skin Normal    Psych:  Psychiatric Normal           Physical Exam  General: Cooperative and Alert    Airway:  Mallampati: I   Mouth Opening: Normal  TM Distance: Normal  Tongue: Normal  Neck ROM: Normal ROM    Dental:  Intact        Anesthesia Plan  Type of Anesthesia, risks & benefits discussed:    Anesthesia Type: Gen Natural Airway, MAC  Intra-op Monitoring Plan: Standard ASA Monitors  Post Op Pain Control Plan: multimodal analgesia  Induction:  IV  Informed Consent: Patient consented to blood products? Yes  ASA Score: 2  Day of Surgery Review of History & Physical: I have interviewed and examined the patient. I have reviewed the patient's H&P dated: There are no significant changes.     Ready For Surgery From Anesthesia Perspective.     .

## 2023-04-25 ENCOUNTER — ANESTHESIA (OUTPATIENT)
Dept: SURGERY | Facility: HOSPITAL | Age: 63
End: 2023-04-25
Payer: MEDICAID

## 2023-04-25 ENCOUNTER — HOSPITAL ENCOUNTER (OUTPATIENT)
Facility: HOSPITAL | Age: 63
Discharge: HOME | End: 2023-04-25
Attending: UROLOGY | Admitting: UROLOGY
Payer: MEDICAID

## 2023-04-25 DIAGNOSIS — R31.29 MICROSCOPIC HEMATURIA: ICD-10-CM

## 2023-04-25 PROCEDURE — 63600175 PHARM REV CODE 636 W HCPCS: Performed by: ANESTHESIOLOGY

## 2023-04-25 PROCEDURE — 63600175 PHARM REV CODE 636 W HCPCS: Performed by: NURSE ANESTHETIST, CERTIFIED REGISTERED

## 2023-04-25 PROCEDURE — 36000707: Performed by: UROLOGY

## 2023-04-25 PROCEDURE — 63600175 PHARM REV CODE 636 W HCPCS: Performed by: UROLOGY

## 2023-04-25 PROCEDURE — 71000015 HC POSTOP RECOV 1ST HR: Performed by: UROLOGY

## 2023-04-25 PROCEDURE — 36000706: Performed by: UROLOGY

## 2023-04-25 PROCEDURE — D9220A PRA ANESTHESIA: Mod: ,,, | Performed by: NURSE ANESTHETIST, CERTIFIED REGISTERED

## 2023-04-25 PROCEDURE — D9220A PRA ANESTHESIA: ICD-10-PCS | Mod: ,,, | Performed by: NURSE ANESTHETIST, CERTIFIED REGISTERED

## 2023-04-25 PROCEDURE — 37000009 HC ANESTHESIA EA ADD 15 MINS: Performed by: UROLOGY

## 2023-04-25 PROCEDURE — 37000008 HC ANESTHESIA 1ST 15 MINUTES: Performed by: UROLOGY

## 2023-04-25 PROCEDURE — 25000003 PHARM REV CODE 250: Performed by: NURSE ANESTHETIST, CERTIFIED REGISTERED

## 2023-04-25 PROCEDURE — 25000003 PHARM REV CODE 250: Performed by: UROLOGY

## 2023-04-25 RX ORDER — GENTAMICIN SULFATE 80 MG/100ML
80 INJECTION, SOLUTION INTRAVENOUS
Status: COMPLETED | OUTPATIENT
Start: 2023-04-25 | End: 2023-04-25

## 2023-04-25 RX ORDER — ONDANSETRON 2 MG/ML
4 INJECTION INTRAMUSCULAR; INTRAVENOUS ONCE
Status: COMPLETED | OUTPATIENT
Start: 2023-04-25 | End: 2023-04-25

## 2023-04-25 RX ORDER — SODIUM CHLORIDE, SODIUM LACTATE, POTASSIUM CHLORIDE, CALCIUM CHLORIDE 600; 310; 30; 20 MG/100ML; MG/100ML; MG/100ML; MG/100ML
INJECTION, SOLUTION INTRAVENOUS CONTINUOUS
Status: DISCONTINUED | OUTPATIENT
Start: 2023-04-25 | End: 2023-04-25 | Stop reason: HOSPADM

## 2023-04-25 RX ORDER — PROPOFOL 10 MG/ML
VIAL (ML) INTRAVENOUS
Status: DISCONTINUED | OUTPATIENT
Start: 2023-04-25 | End: 2023-04-25

## 2023-04-25 RX ORDER — LIDOCAINE HYDROCHLORIDE 20 MG/ML
JELLY TOPICAL
Status: DISCONTINUED | OUTPATIENT
Start: 2023-04-25 | End: 2023-04-25 | Stop reason: HOSPADM

## 2023-04-25 RX ORDER — ONDANSETRON 2 MG/ML
INJECTION INTRAMUSCULAR; INTRAVENOUS
Status: DISCONTINUED | OUTPATIENT
Start: 2023-04-25 | End: 2023-04-25

## 2023-04-25 RX ORDER — LIDOCAINE HYDROCHLORIDE 20 MG/ML
INJECTION, SOLUTION EPIDURAL; INFILTRATION; INTRACAUDAL; PERINEURAL
Status: DISCONTINUED | OUTPATIENT
Start: 2023-04-25 | End: 2023-04-25

## 2023-04-25 RX ADMIN — PROPOFOL 50 MG: 10 INJECTION, EMULSION INTRAVENOUS at 07:04

## 2023-04-25 RX ADMIN — GENTAMICIN SULFATE 80 MG: 80 INJECTION, SOLUTION INTRAVENOUS at 07:04

## 2023-04-25 RX ADMIN — LIDOCAINE HYDROCHLORIDE 60 MG: 20 INJECTION, SOLUTION EPIDURAL; INFILTRATION; INTRACAUDAL; PERINEURAL at 07:04

## 2023-04-25 RX ADMIN — SODIUM CHLORIDE, POTASSIUM CHLORIDE, SODIUM LACTATE AND CALCIUM CHLORIDE: 600; 310; 30; 20 INJECTION, SOLUTION INTRAVENOUS at 06:04

## 2023-04-25 RX ADMIN — PROPOFOL 20 MG: 10 INJECTION, EMULSION INTRAVENOUS at 07:04

## 2023-04-25 RX ADMIN — ONDANSETRON 4 MG: 2 INJECTION INTRAMUSCULAR; INTRAVENOUS at 06:04

## 2023-04-25 RX ADMIN — ONDANSETRON 4 MG: 2 INJECTION INTRAMUSCULAR; INTRAVENOUS at 07:04

## 2023-04-25 RX ADMIN — PROPOFOL 80 MG: 10 INJECTION, EMULSION INTRAVENOUS at 07:04

## 2023-04-25 NOTE — ANESTHESIA POSTPROCEDURE EVALUATION
Anesthesia Post Evaluation    Patient: Migdalia Moreno    Procedure(s) Performed: Procedure(s) (LRB):  CYSTOSCOPY (N/A)    Final Anesthesia Type: general      Patient participation: Yes- Able to Participate  Level of consciousness: awake and alert and oriented  Post-procedure vital signs: reviewed and stable  Pain management: adequate  Airway patency: patent    PONV status at discharge: No PONV  Anesthetic complications: no      Cardiovascular status: stable  Respiratory status: unassisted, spontaneous ventilation and room air  Hydration status: euvolemic  Follow-up not needed.          Vitals Value Taken Time   /79 04/25/23 0629   Temp 36.6 °C (97.8 °F) 04/25/23 0629   Pulse 64 04/25/23 0629   Resp 16 04/25/23 0751   SpO2 99 % 04/25/23 0629         No case tracking events are documented in the log.      Pain/Jayden Score: No data recorded

## 2023-04-25 NOTE — BRIEF OP NOTE
[facility] - Periop Services  Brief Operative Note    Surgery Date: 4/25/2023     Surgeon(s) and Role:     * Nixon Dahl MD - Primary    Assisting Surgeon: None    Pre-op Diagnosis:  Hematuria, microscopic [R31.29]  Chronic UTI [N39.0]    Post-op Diagnosis:  Post-Op Diagnosis Codes:     * Hematuria, microscopic [R31.29]     * Chronic UTI [N39.0]    Procedure(s) (LRB):  CYSTOSCOPY (N/A)    Anesthesia: IV sedation and local    Operative Findings:  After operative consent patient brought to the operating room placed on the table in the supine position IV sedation induced patient converted to dorsal lithotomy position and genital area prepped and draped in usual sterile fashion after adequate local anesthesia with 2% plain lidocaine jelly intraurethrally the 22 [place] cystoscope was inserted per urethra in the bladder under direct vision urethral cerebral lesions bladder demonstrated diffuse mild trabeculation no foreign bodies or tumors identified ureteral orifice normal size shape position with clear effluent this time the bladder was drained cystoscope was removed bimanual examination performed which revealed an intact cervix she had a mild cystocele rectal examination unremarkable patient returned to her room in good condition she tolerated procedure well there was no blood loss  Laboratory data revealed a white blood cell count of 4.8 hematocrit 41.4 creatinine 0.86 electrolytes within normal limits  Plan will be to follow up in the office in 1 month with urinalysis  Please send a copy of dictation to [facility]  Estimated Blood Loss: * No values recorded between 4/25/2023  7:43 AM and 4/25/2023  7:50 AM *         Specimens:   Specimen (24h ago, onward)      None              Discharge Note    OUTCOME: Patient tolerated treatment/procedure well without complication and is now ready for discharge.    DISPOSITION: Home or Self Care    FINAL DIAGNOSIS:  Microhematuria chronic  UTI    FOLLOWUP: In clinic    DISCHARGE INSTRUCTIONS:  No discharge procedures on file.

## 2023-04-28 VITALS
HEART RATE: 62 BPM | HEIGHT: 68 IN | SYSTOLIC BLOOD PRESSURE: 110 MMHG | WEIGHT: 155.44 LBS | BODY MASS INDEX: 23.56 KG/M2 | DIASTOLIC BLOOD PRESSURE: 80 MMHG | OXYGEN SATURATION: 99 % | TEMPERATURE: 97 F | RESPIRATION RATE: 18 BRPM

## 2023-10-12 ENCOUNTER — LAB VISIT (OUTPATIENT)
Dept: LAB | Facility: HOSPITAL | Age: 63
End: 2023-10-12
Attending: UROLOGY
Payer: MEDICAID

## 2023-10-12 DIAGNOSIS — N39.0 URINARY TRACT INFECTION, SITE NOT SPECIFIED: Primary | ICD-10-CM

## 2023-10-12 LAB
ALBUMIN SERPL-MCNC: 3.9 G/DL (ref 3.4–4.8)
ALBUMIN/GLOB SERPL: 1.3 RATIO (ref 1.1–2)
ALP SERPL-CCNC: 79 UNIT/L (ref 40–150)
ALT SERPL-CCNC: 17 UNIT/L (ref 0–55)
AST SERPL-CCNC: 19 UNIT/L (ref 5–34)
BILIRUB SERPL-MCNC: 0.6 MG/DL
BUN SERPL-MCNC: 13 MG/DL (ref 9.8–20.1)
CALCIUM SERPL-MCNC: 9.6 MG/DL (ref 8.4–10.2)
CHLORIDE SERPL-SCNC: 105 MMOL/L (ref 98–107)
CO2 SERPL-SCNC: 28 MMOL/L (ref 23–31)
CREAT SERPL-MCNC: 0.87 MG/DL (ref 0.55–1.02)
GFR SERPLBLD CREATININE-BSD FMLA CKD-EPI: >60 MLS/MIN/1.73/M2
GLOBULIN SER-MCNC: 3 GM/DL (ref 2.4–3.5)
GLUCOSE SERPL-MCNC: 99 MG/DL (ref 82–115)
POTASSIUM SERPL-SCNC: 4.1 MMOL/L (ref 3.5–5.1)
PROT SERPL-MCNC: 6.9 GM/DL (ref 5.8–7.6)
SODIUM SERPL-SCNC: 141 MMOL/L (ref 136–145)

## 2023-10-12 PROCEDURE — 80053 COMPREHEN METABOLIC PANEL: CPT

## 2023-10-12 PROCEDURE — 36415 COLL VENOUS BLD VENIPUNCTURE: CPT

## 2024-01-16 ENCOUNTER — HOSPITAL ENCOUNTER (EMERGENCY)
Facility: HOSPITAL | Age: 64
Discharge: HOME | End: 2024-01-16
Attending: INTERNAL MEDICINE
Payer: MEDICAID

## 2024-01-16 VITALS
DIASTOLIC BLOOD PRESSURE: 79 MMHG | TEMPERATURE: 98 F | SYSTOLIC BLOOD PRESSURE: 128 MMHG | BODY MASS INDEX: 24.25 KG/M2 | HEART RATE: 71 BPM | OXYGEN SATURATION: 98 % | RESPIRATION RATE: 16 BRPM | WEIGHT: 160 LBS | HEIGHT: 68 IN

## 2024-01-16 DIAGNOSIS — S09.90XA INJURY OF HEAD, INITIAL ENCOUNTER: ICD-10-CM
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
## 2024-01-16 PROCEDURE — 99284 EMERGENCY DEPT VISIT MOD MDM: CPT | Mod: 25

## 2024-01-16 RX ORDER — HYDRALAZINE HYDROCHLORIDE 20 MG/ML
10 INJECTION INTRAMUSCULAR; INTRAVENOUS
Status: DISCONTINUED | OUTPATIENT
Start: 2024-01-16 | End: 2024-01-16

## 2024-01-16 RX ORDER — MUPIROCIN 20 MG/G
OINTMENT TOPICAL 3 TIMES DAILY
Qty: 22 G | Refills: 0 | Status: SHIPPED | OUTPATIENT
Start: 2024-01-16 | End: 2024-01-21

## 2024-01-17 NOTE — ED PROVIDER NOTES
Encounter Date: 1/16/2024       History     Chief Complaint   Patient presents with    Fall     On second step of 3ft ladder and fell hitting her head on the corner of a baseboard. Denies LOC. Not on blood thinners. Small wound to left side of head.      Patient is a 63-year-old female who presents to the emergency department with complaints of left-sided head pain.  She states she was on a ladder doing some work around her house and missed the 3rd step causing a fall striking her head on the crown molding on the floor.  She states she heard a large crack but denies losing consciousness.  She states she noticed immediate bleeding and her head is very tender to her touch.  She denies any dizziness or headache at this time.  She denies taking any blood thinning products.  She denies any other complaints or associated symptoms at this time.      Review of patient's allergies indicates:   Allergen Reactions    Penicillins Anaphylaxis    Adapalene-benzoyl peroxide     Codeine Itching    Hydrocodone-acetaminophen Itching     Past Medical History:   Diagnosis Date    Chronic UTI     Diabetes mellitus     Disorder of thyroid, unspecified     Muscle weakness      Past Surgical History:   Procedure Laterality Date    CYSTOSCOPY N/A 4/25/2023    Procedure: CYSTOSCOPY;  Surgeon: Nixon Dahl MD;  Location: [facility];  Service: Urology;  Laterality: N/A;  POST OP: SAME    MUSCLE BIOPSY  12/16/2019    POLYPECTOMY  10/11/2021     Family History   Problem Relation Age of Onset    Heart disease Mother     Cancer Father     Heart disease Brother     Heart disease Maternal Grandmother      Social History     Tobacco Use    Smoking status: Never    Smokeless tobacco: Never   Substance Use Topics    Alcohol use: Not Currently    Drug use: Never     Review of Systems   Constitutional:  Negative for activity change, appetite change and fever.   HENT:  Negative for congestion, dental problem and sore throat.    Respiratory:  Negative  for shortness of breath.    Cardiovascular:  Negative for chest pain.   Gastrointestinal:  Negative for nausea.   Genitourinary:  Negative for dysuria, vaginal bleeding, vaginal discharge and vaginal pain.   Musculoskeletal:  Negative for back pain.   Skin:  Positive for wound. Negative for rash.   Neurological:  Positive for headaches. Negative for dizziness, facial asymmetry and weakness.   Hematological:  Does not bruise/bleed easily.   Psychiatric/Behavioral:  Negative for agitation.    All other systems reviewed and are negative.      Physical Exam     Initial Vitals [01/16/24 2030]   BP Pulse Resp Temp SpO2   132/72 77 17 97.6 °F (36.4 °C) 98 %      MAP       --         Physical Exam    Nursing note and vitals reviewed.  Constitutional: Vital signs are normal. She appears well-developed and well-nourished.  Non-toxic appearance. She does not have a sickly appearance.   HENT:   Head: Normocephalic and atraumatic.   Right Ear: External ear normal.   Left Ear: External ear normal.   Eyes: Conjunctivae, EOM and lids are normal. Lids are everted and swept, no foreign bodies found.   Neck: Trachea normal and phonation normal. Neck supple. No thyroid mass and no thyromegaly present.   Normal range of motion.   Full passive range of motion without pain.     Cardiovascular:  Normal rate, regular rhythm, S1 normal, S2 normal, normal heart sounds, intact distal pulses and normal pulses.           Pulmonary/Chest: Breath sounds normal.   Musculoskeletal:      Cervical back: Full passive range of motion without pain, normal range of motion and neck supple.     Lymphadenopathy:     She has no cervical adenopathy.   Neurological: She is alert and oriented to person, place, and time. She has normal strength. GCS score is 15. GCS eye subscore is 4. GCS verbal subscore is 5. GCS motor subscore is 6.   Skin: Skin is warm, dry and intact. Capillary refill takes less than 2 seconds.   Moderate swelling to the scalp with pinpoint  puncture wound with small amount of oozing blood.  Area of the scalp is very tender to touch and soft.   Psychiatric: She has a normal mood and affect. Her speech is normal and behavior is normal. Judgment normal. Cognition and memory are normal.         ED Course   Procedures  Labs Reviewed - No data to display       Imaging Results              CT Head Without Contrast (Preliminary result)  Result time 01/16/24 21:36:07      Preliminary result by Elio Mosquera Jr., MD (01/16/24 21:36:07)                   Narrative:    START OF REPORT:  Technique: CT of the head was performed without intravenous contrast with axial as well as coronal and sagittal images.    Comparison: None.    Dosage Information: Automated exposure control was utilized.    Clinical history: Fall.    Findings:  Hemorrhage: No acute intracranial hemorrhage is seen.  CSF spaces: The ventricles, sulci and basal cisterns all appear somewhat prominent suggesting an element of global cerebral atrophy.  Brain parenchyma: Minimal scattered microvascular change is seen in portions of the periventricular and deep white matter tracts.  Cerebellum: Unremarkable.  Sella and skull base: The sella appears to be within normal limits for age.  Intracranial calcifications: Incidental note is made of bilateral choroid plexus calcification. Incidental note is made of some pineal region calcification. Incidental note is made of some calcification of the falx.  Calvarium: No acute linear or depressed skull fracture is seen.    Maxillofacial Structures:  Paranasal sinuses: The visualized paranasal sinuses appear clear with no mucoperiosteal thickening or air fluid levels identified.  Orbits: The orbits appear unremarkable.  Zygomatic arches: The zygomatic arches are intact and unremarkable.  Temporal bones and mastoids: The temporal bones and mastoids appear unremarkable.  TMJ: The mandibular condyles appear normally placed with respect to the mandibular  fossa.      Impression:  1. No acute intracranial process identified. Details and findings as noted above.                                         Medications - No data to display  Medical Decision Making  Patient is a 63-year-old female who presents to the emergency department with complaints of left-sided head pain.  She states she was on a ladder doing some work around her house and missed the 3rd step causing a fall striking her head on the crown molding on the floor.  She states she heard a large crack but denies losing consciousness.  She states she noticed immediate bleeding and her head is very tender to her touch.  She denies any dizziness or headache at this time.  She denies taking any blood thinning products.  She denies any other complaints or associated symptoms at this time.        Problems Addressed:  Injury of head, initial encounter: acute illness or injury     Details: CT head was done without any acute abnormality.  Patient does not want any medication for the pain.  Will give topical antibiotic ointment to heal the wound.  Strict ED return precautions discussed for any change or worsening symptoms.    Amount and/or Complexity of Data Reviewed  Radiology: ordered. Decision-making details documented in ED Course.    Risk  Prescription drug management.                                      Clinical Impression:  Final diagnoses:  [W19.XXXA] Fall, initial encounter (Primary)  [S09.90XA] Injury of head, initial encounter          ED Disposition Condition    Discharge Stable          ED Prescriptions       Medication Sig Dispense Start Date End Date Auth. Provider    mupirocin (BACTROBAN) 2 % ointment Apply topically 3 (three) times daily. for 5 days 22 g 1/16/2024 1/21/2024 Mateo Coker FNP          Follow-up Information       Follow up With Specialties Details Why Contact Nichole Hernández NP Family Medicine Schedule an appointment as soon as possible for a visit in 2 days If symptoms  worsen, For ER Follow Up. [address] [facility] [address]  [phone]               Mateo Coker, FNP  01/16/24 2148

## 2024-06-21 NOTE — PROGRESS NOTES
[facility] Neurology Follow Up Office Visit Note    Last Visit Date: 11/22/2022  Current Visit Date:  06/25/2024    Chief Complaint:     Chief Complaint   Patient presents with    Myasthenia gravis     Saw Dr Vegas 4/30/2024; Having another EMG 7/12/2024 with Dr Vegas    Ptosis of left eye     History of Present Illness:      This is 64 y.o. female with history of hypothyroidism, DM II, who is referred for suspected generalized exertional weakness with proximal muscle and bulbar involvement.  Patient has since seen Dr. Vegas for repeat NCS with repeat stim and single fiber EMG. [employer] Mitochondrial Panel was unrevealing. Patient is pending repeat single fiber.      Age of Onset - [age]    Semiology - exertional SOB, proximal hip and shoulder weakness with exertion. Has trouble with swallowing for at least six years. Has trouble with ptosis for at least 3 year. Symptoms improve with rest. Symptoms impeding day to day activity. Now complaining of episodes of weakness since her 20s.       Medications:     Current Outpatient Medications on File Prior to Visit   Medication Sig Dispense Refill    ergocalciferol (ERGOCALCIFEROL) 50,000 unit Cap Take 50,000 Units by mouth every 7 days. Not taking      levothyroxine (SYNTHROID) 100 MCG tablet Take 1 tablet (100 mcg total) by mouth before breakfast. 90 tablet 3    simvastatin (ZOCOR) 20 MG tablet Take 1 tablet (20 mg total) by mouth every evening. (Patient not taking: Reported on 4/21/2023) 90 tablet 3     No current facility-administered medications on file prior to visit.       Labs:     Results for orders placed or performed in visit on 10/12/23   Comprehensive Metabolic Panel   Result Value Ref Range    Sodium 141 136 - 145 mmol/L    Potassium 4.1 3.5 - 5.1 mmol/L    Chloride 105 98 - 107 mmol/L    CO2 28 23 - 31 mmol/L    Glucose 99 82 - 115 mg/dL    Blood Urea Nitrogen 13.0 9.8 - 20.1 mg/dL    Creatinine 0.87 0.55 - 1.02 mg/dL    Calcium 9.6 8.4 - 10.2 mg/dL    Protein  Total 6.9 5.8 - 7.6 gm/dL    Albumin 3.9 3.4 - 4.8 g/dL    Globulin 3.0 2.4 - 3.5 gm/dL    Albumin/Globulin Ratio 1.3 1.1 - 2.0 ratio    Bilirubin Total 0.6 <=1.5 mg/dL    ALP 79 40 - 150 unit/L    ALT 17 0 - 55 unit/L    AST 19 5 - 34 unit/L    eGFR >60 mls/min/1.73/m2       Studies:      Anti-AChR Bind Ab Lv - [facility] 4/30/2024 - 0   Anti-AChR Blocking Ab Lv -  [facility] 4/30/2024 - 0    Anti-MuSK Ab - [facility]  [facility] 4/30/2024 - 0        Anti-AChR Bind Ab  - [facility] 12/11/2019 - 0   Anti-AChR Blocking Ab  - [facility] 12/11/2019 - 0   Anti-MuSK Ab - [facility] 1/17/2020 - 0   Anti-Titin Ab  - [facility] 1/17/2020 - 0  LES Panel 1/17/2020 - negative   Anti- Striated Muscle Ab lv a1/17/2020 - negative  Paraneoplastic panel 3/6/2020 - negative    Muscle Biopsy 12/2019 -  Type II myofiber atrophy is a nonspecific finding that may be present in  the setting of chronic steroid use, Cushings syndrome, disuse atrophy,  myasthenia gravis, upper motor neuron disease, congenital hypotonia,  hyperparathyroidism, paraneoplastic syndromes, connective tissue  disorders and alcoholic myopathy.    The etiology of this acute denervation is unclear. The differential  diagnosis of acute denervation would include but is not limited to motor  neuron diseases, plexitis, radiculopathy, complications of diabetes,  acute neuropathies, muscle trauma or critical illness polyneuropathy.  Clinical correlation is required.    MRI Lumbar 6/18/2018 - I have reviewed the study independently and with the patient. moderate spinal stenosis noted at L2-3, no significant neuroforaminal stenosis noted.    MRI Brain +/- [facility] 5/2018 - I have reviewed the study independently and with the patient. Unremarkable MRI Brain.    EMG/NCS Repeat Stim and SF EMG 9/2/2022:  Normal NCS of lower extremity sensory and motor distributions, EMG of the UE and LE did not show any active or chronic denervation and was normal without myopathic findings. SF EMG showed normal amount of jitter  without blocking. Rep stim was not consistent with muscle junction abnormality.     Review of Systems:     All other systems reviewed and are negative.    Physical Exams:     Vitals:    06/25/24 1346   BP: 116/73   Pulse: 78   Resp: 12   Temp: 98.4 °F (36.9 °C)         Vitals and nursing note reviewed.   Constitutional:       Appearance: Normal appearance.   HENT:      Head: Normocephalic and atraumatic.      Nose: Nose normal.      Mouth/Throat:      Mouth: Mucous membranes are moist.      Pharynx: Oropharynx is clear.   Eyes:      Conjunctiva/sclera: Conjunctivae normal.   Cardiovascular:      Rate and Rhythm: Normal rate and regular rhythm.      Pulses: Normal pulses.   Pulmonary:      Effort: Pulmonary effort is normal.      Breath sounds: Normal breath sounds.   Abdominal:      General: Abdomen is flat.   Musculoskeletal:         General: Normal range of motion.      Cervical back: Normal range of motion.   Skin:     General: Skin is warm.   Neurological:      Mental Status: She is alert.          Comprehensive Neurological Exam:  Mental Status: Alert Oriented to Self, Date, and Place. Naming, repetition, reading, and writing wnl. Comprehension wnl. No dysarthria.   CN II-XII - CN I Deferred, JACI, CN V1-3, visual field due to nature of visit, ptosis OS 0.3 cm with 0.1 cm  latent ptosis, with myokymia, OS exophoria with worsening medial rectus weakness with repeat activation, LT/PP symmetric, intact in CN V1-V3 distribution b/l, masseter symmetric b/l, T/U midline, Shoulder shrug symmetric b/l, Hearing grossly intact b/l, VFFC, Face Symmetric.  Motor - Tone and Bulk nml throughout, No involuntary movements 5/5 in Neck Extension and Flexion, FFM nml b/l, No pronator drift.  Sensory - LT/PP/Temp/Proprioception/Vibration symmetric intact throughout.  Reflexes - 2+ Throughout, Babinski negative.  Cerebellar Exam - FNF wnl b/l no intention tr emor.  Romberg - negative.  Gait - waddling gait        Assessment:      This is 64 y.o. female with history of hypothyroidism, DM II, who is referred for muscular dystrophy.    Problem List Items Addressed This Visit          Ophtho    Ptosis of left eyelid - Primary     Other Visit Diagnoses       Myasthenia gravis                  Plan:     [] stop Mestinon 60 mg TID    RTC PRN    I have explained the treatment plan, diagnosis, and prognosis to patient. All questions are answered to the best of my knowledge.     Face to face time 30 minutes, including documentation, chart review, counseling, education, review of test results, relevant medical records, and coordination of care.   I have explained the treatment plan, diagnosis, and prognosis to patient. All questions are answered to the best of my knowledge.       Nicole Singer MD   General Neurology  06/25/2024

## 2024-06-25 ENCOUNTER — OFFICE VISIT (OUTPATIENT)
Dept: NEUROLOGY | Facility: CLINIC | Age: 64
End: 2024-06-25
Payer: MEDICAID

## 2024-06-25 VITALS
HEIGHT: 68 IN | WEIGHT: 164 LBS | BODY MASS INDEX: 24.86 KG/M2 | RESPIRATION RATE: 12 BRPM | SYSTOLIC BLOOD PRESSURE: 116 MMHG | OXYGEN SATURATION: 99 % | DIASTOLIC BLOOD PRESSURE: 73 MMHG | TEMPERATURE: 98 F | HEART RATE: 78 BPM

## 2024-06-25 DIAGNOSIS — H02.402 PTOSIS OF LEFT EYELID: Primary | ICD-10-CM

## 2024-06-25 DIAGNOSIS — G70.00 MYASTHENIA GRAVIS: ICD-10-CM

## 2024-06-25 PROCEDURE — 3008F BODY MASS INDEX DOCD: CPT | Mod: CPTII,,, | Performed by: PSYCHIATRY & NEUROLOGY

## 2024-06-25 PROCEDURE — 1159F MED LIST DOCD IN RCRD: CPT | Mod: CPTII,,, | Performed by: PSYCHIATRY & NEUROLOGY

## 2024-06-25 PROCEDURE — 3074F SYST BP LT 130 MM HG: CPT | Mod: CPTII,,, | Performed by: PSYCHIATRY & NEUROLOGY

## 2024-06-25 PROCEDURE — 3078F DIAST BP <80 MM HG: CPT | Mod: CPTII,,, | Performed by: PSYCHIATRY & NEUROLOGY

## 2024-06-25 PROCEDURE — 99213 OFFICE O/P EST LOW 20 MIN: CPT | Mod: PBBFAC | Performed by: PSYCHIATRY & NEUROLOGY

## 2024-06-25 PROCEDURE — 99214 OFFICE O/P EST MOD 30 MIN: CPT | Mod: S$PBB,,, | Performed by: PSYCHIATRY & NEUROLOGY

## 2025-03-23 NOTE — Clinical Note
"Migdalia Boykingordon Moreno was seen and treated in our emergency department on 1/16/2024.  She may return to work on 01/18/2024.       If you have any questions or concerns, please don't hesitate to call.      Mateo Coker, NESTOR"
with patient

## 2025-07-23 DIAGNOSIS — Z12.39 ENCOUNTER FOR SPECIAL SCREENING EXAMINATION FOR NEOPLASM OF BREAST: Primary | ICD-10-CM

## (undated) DEVICE — DRAPE LEGGINGS CUFF 33X51IN

## (undated) DEVICE — GOWN POLY REINF BRTH SLV XL

## (undated) DEVICE — GLOVE PROTEXIS HYDROGEL SZ6.5

## (undated) DEVICE — TOWEL OR DISP STRL BLUE 4/PK

## (undated) DEVICE — SET CYSTO IRR DRP CHMBR 84IN

## (undated) DEVICE — COVER TABLE 44X90 STERILE

## (undated) DEVICE — SPONGE DERMACEA 4X4IN 12PLY

## (undated) DEVICE — TRAY SKIN SCRUB WET PREMIUM

## (undated) DEVICE — GLOVE PROTEXIS HYDROGEL SZ7.5

## (undated) DEVICE — DRAPE T CYSTOSCOPY STERILE